# Patient Record
Sex: FEMALE | Race: WHITE | NOT HISPANIC OR LATINO | URBAN - METROPOLITAN AREA
[De-identification: names, ages, dates, MRNs, and addresses within clinical notes are randomized per-mention and may not be internally consistent; named-entity substitution may affect disease eponyms.]

---

## 2023-08-18 ENCOUNTER — EMERGENCY (EMERGENCY)
Facility: HOSPITAL | Age: 28
LOS: 1 days | Discharge: HOME | End: 2023-08-18
Attending: EMERGENCY MEDICINE | Admitting: EMERGENCY MEDICINE
Payer: COMMERCIAL

## 2023-08-18 VITALS
HEART RATE: 86 BPM | OXYGEN SATURATION: 98 % | DIASTOLIC BLOOD PRESSURE: 86 MMHG | SYSTOLIC BLOOD PRESSURE: 119 MMHG | WEIGHT: 179.9 LBS | TEMPERATURE: 98 F | RESPIRATION RATE: 16 BRPM | HEIGHT: 64 IN

## 2023-08-18 VITALS
OXYGEN SATURATION: 99 % | HEART RATE: 79 BPM | DIASTOLIC BLOOD PRESSURE: 65 MMHG | SYSTOLIC BLOOD PRESSURE: 100 MMHG | TEMPERATURE: 98 F | RESPIRATION RATE: 17 BRPM

## 2023-08-18 LAB
ALBUMIN SERPL ELPH-MCNC: 4 G/DL — SIGNIFICANT CHANGE UP (ref 3.3–5)
ALP SERPL-CCNC: 84 U/L — SIGNIFICANT CHANGE UP (ref 40–120)
ALT FLD-CCNC: 18 U/L — SIGNIFICANT CHANGE UP (ref 12–78)
ANION GAP SERPL CALC-SCNC: 6 MMOL/L — SIGNIFICANT CHANGE UP (ref 5–17)
APPEARANCE UR: ABNORMAL
AST SERPL-CCNC: 14 U/L — LOW (ref 15–37)
BASOPHILS # BLD AUTO: 0.04 K/UL — SIGNIFICANT CHANGE UP (ref 0–0.2)
BASOPHILS NFR BLD AUTO: 0.5 % — SIGNIFICANT CHANGE UP (ref 0–2)
BILIRUB SERPL-MCNC: 0.3 MG/DL — SIGNIFICANT CHANGE UP (ref 0.2–1.2)
BILIRUB UR-MCNC: NEGATIVE — SIGNIFICANT CHANGE UP
BUN SERPL-MCNC: 14 MG/DL — SIGNIFICANT CHANGE UP (ref 7–23)
CALCIUM SERPL-MCNC: 9.6 MG/DL — SIGNIFICANT CHANGE UP (ref 8.5–10.1)
CHLORIDE SERPL-SCNC: 107 MMOL/L — SIGNIFICANT CHANGE UP (ref 96–108)
CO2 SERPL-SCNC: 24 MMOL/L — SIGNIFICANT CHANGE UP (ref 22–31)
COLOR SPEC: YELLOW — SIGNIFICANT CHANGE UP
CREAT SERPL-MCNC: 0.85 MG/DL — SIGNIFICANT CHANGE UP (ref 0.5–1.3)
DIFF PNL FLD: ABNORMAL
EGFR: 95 ML/MIN/1.73M2 — SIGNIFICANT CHANGE UP
EOSINOPHIL # BLD AUTO: 0.02 K/UL — SIGNIFICANT CHANGE UP (ref 0–0.5)
EOSINOPHIL NFR BLD AUTO: 0.3 % — SIGNIFICANT CHANGE UP (ref 0–6)
GLUCOSE SERPL-MCNC: 140 MG/DL — HIGH (ref 70–99)
GLUCOSE UR QL: NEGATIVE MG/DL — SIGNIFICANT CHANGE UP
HCG SERPL-ACNC: <1 MIU/ML — SIGNIFICANT CHANGE UP
HCT VFR BLD CALC: 31.2 % — LOW (ref 34.5–45)
HGB BLD-MCNC: 8.8 G/DL — LOW (ref 11.5–15.5)
IMM GRANULOCYTES NFR BLD AUTO: 0.3 % — SIGNIFICANT CHANGE UP (ref 0–0.9)
KETONES UR-MCNC: ABNORMAL MG/DL
LEUKOCYTE ESTERASE UR-ACNC: NEGATIVE — SIGNIFICANT CHANGE UP
LYMPHOCYTES # BLD AUTO: 1.13 K/UL — SIGNIFICANT CHANGE UP (ref 1–3.3)
LYMPHOCYTES # BLD AUTO: 14.2 % — SIGNIFICANT CHANGE UP (ref 13–44)
MCHC RBC-ENTMCNC: 17.5 PG — LOW (ref 27–34)
MCHC RBC-ENTMCNC: 28.2 GM/DL — LOW (ref 32–36)
MCV RBC AUTO: 61.9 FL — LOW (ref 80–100)
MONOCYTES # BLD AUTO: 0.41 K/UL — SIGNIFICANT CHANGE UP (ref 0–0.9)
MONOCYTES NFR BLD AUTO: 5.2 % — SIGNIFICANT CHANGE UP (ref 2–14)
NEUTROPHILS # BLD AUTO: 6.31 K/UL — SIGNIFICANT CHANGE UP (ref 1.8–7.4)
NEUTROPHILS NFR BLD AUTO: 79.5 % — HIGH (ref 43–77)
NITRITE UR-MCNC: NEGATIVE — SIGNIFICANT CHANGE UP
NRBC # BLD: 0 /100 WBCS — SIGNIFICANT CHANGE UP (ref 0–0)
PH UR: 6 — SIGNIFICANT CHANGE UP (ref 5–8)
PLATELET # BLD AUTO: 313 K/UL — SIGNIFICANT CHANGE UP (ref 150–400)
POTASSIUM SERPL-MCNC: 3.7 MMOL/L — SIGNIFICANT CHANGE UP (ref 3.5–5.3)
POTASSIUM SERPL-SCNC: 3.7 MMOL/L — SIGNIFICANT CHANGE UP (ref 3.5–5.3)
PROT SERPL-MCNC: 8 G/DL — SIGNIFICANT CHANGE UP (ref 6–8.3)
PROT UR-MCNC: 100 MG/DL
RBC # BLD: 5.04 M/UL — SIGNIFICANT CHANGE UP (ref 3.8–5.2)
RBC # FLD: 20.9 % — HIGH (ref 10.3–14.5)
SODIUM SERPL-SCNC: 137 MMOL/L — SIGNIFICANT CHANGE UP (ref 135–145)
SP GR SPEC: 1.03 — HIGH (ref 1–1.03)
UROBILINOGEN FLD QL: 1 MG/DL — SIGNIFICANT CHANGE UP (ref 0.2–1)
WBC # BLD: 7.93 K/UL — SIGNIFICANT CHANGE UP (ref 3.8–10.5)
WBC # FLD AUTO: 7.93 K/UL — SIGNIFICANT CHANGE UP (ref 3.8–10.5)

## 2023-08-18 PROCEDURE — 96374 THER/PROPH/DIAG INJ IV PUSH: CPT

## 2023-08-18 PROCEDURE — 80053 COMPREHEN METABOLIC PANEL: CPT

## 2023-08-18 PROCEDURE — 84702 CHORIONIC GONADOTROPIN TEST: CPT

## 2023-08-18 PROCEDURE — 74176 CT ABD & PELVIS W/O CONTRAST: CPT | Mod: 26,MA

## 2023-08-18 PROCEDURE — 99284 EMERGENCY DEPT VISIT MOD MDM: CPT | Mod: 25

## 2023-08-18 PROCEDURE — 81001 URINALYSIS AUTO W/SCOPE: CPT

## 2023-08-18 PROCEDURE — 36415 COLL VENOUS BLD VENIPUNCTURE: CPT

## 2023-08-18 PROCEDURE — 74176 CT ABD & PELVIS W/O CONTRAST: CPT | Mod: MA

## 2023-08-18 PROCEDURE — 96375 TX/PRO/DX INJ NEW DRUG ADDON: CPT

## 2023-08-18 PROCEDURE — 85025 COMPLETE CBC W/AUTO DIFF WBC: CPT

## 2023-08-18 PROCEDURE — 99284 EMERGENCY DEPT VISIT MOD MDM: CPT

## 2023-08-18 RX ORDER — SODIUM CHLORIDE 9 MG/ML
1000 INJECTION INTRAMUSCULAR; INTRAVENOUS; SUBCUTANEOUS ONCE
Refills: 0 | Status: COMPLETED | OUTPATIENT
Start: 2023-08-18 | End: 2023-08-18

## 2023-08-18 RX ORDER — KETOROLAC TROMETHAMINE 30 MG/ML
30 SYRINGE (ML) INJECTION ONCE
Refills: 0 | Status: DISCONTINUED | OUTPATIENT
Start: 2023-08-18 | End: 2023-08-18

## 2023-08-18 RX ORDER — DEXTROAMPHETAMINE SACCHARATE, AMPHETAMINE ASPARTATE, DEXTROAMPHETAMINE SULFATE AND AMPHETAMINE SULFATE 1.875; 1.875; 1.875; 1.875 MG/1; MG/1; MG/1; MG/1
1 TABLET ORAL
Refills: 0 | DISCHARGE

## 2023-08-18 RX ORDER — CEFUROXIME AXETIL 250 MG
1 TABLET ORAL
Qty: 14 | Refills: 0
Start: 2023-08-18 | End: 2023-08-24

## 2023-08-18 RX ORDER — ONDANSETRON 8 MG/1
4 TABLET, FILM COATED ORAL ONCE
Refills: 0 | Status: COMPLETED | OUTPATIENT
Start: 2023-08-18 | End: 2023-08-18

## 2023-08-18 RX ORDER — ALPRAZOLAM 0.25 MG
1 TABLET ORAL
Refills: 0 | DISCHARGE

## 2023-08-18 RX ORDER — FLUCONAZOLE 150 MG/1
1 TABLET ORAL
Qty: 1 | Refills: 0
Start: 2023-08-18

## 2023-08-18 RX ORDER — LACTOBACILLUS ACIDOPHILUS 100MM CELL
2 CAPSULE ORAL
Qty: 28 | Refills: 0
Start: 2023-08-18 | End: 2023-08-24

## 2023-08-18 RX ORDER — ACETAMINOPHEN 500 MG
1000 TABLET ORAL ONCE
Refills: 0 | Status: COMPLETED | OUTPATIENT
Start: 2023-08-18 | End: 2023-08-18

## 2023-08-18 RX ADMIN — Medication 1000 MILLIGRAM(S): at 06:42

## 2023-08-18 RX ADMIN — Medication 400 MILLIGRAM(S): at 05:28

## 2023-08-18 RX ADMIN — ONDANSETRON 4 MILLIGRAM(S): 8 TABLET, FILM COATED ORAL at 05:28

## 2023-08-18 RX ADMIN — SODIUM CHLORIDE 1000 MILLILITER(S): 9 INJECTION INTRAMUSCULAR; INTRAVENOUS; SUBCUTANEOUS at 05:28

## 2023-08-18 NOTE — ED PROVIDER NOTE - NSFOLLOWUPINSTRUCTIONS_ED_ALL_ED_FT
1. Follow-up with your Primary Medical doctor. Call today for prompt follow-up.  2. Return to Emergency room for any worsening or persistent pain, weakness, fever, dizziness, passing out, difficulty breathing or any other concerning symptoms.  3. See attached instruction sheets for additional information, including information regarding signs and symptoms to look out for, reasons to seek immediate care and other important instructions.  4.  Plenty of fluids  5.  Ceftin twice daily for 7 days 1. Follow-up with your Primary Medical doctor. Call today for prompt follow-up.  2. Return to Emergency room for any worsening or persistent pain, weakness, fever, dizziness, passing out, difficulty breathing or any other concerning symptoms.  3. See attached instruction sheets for additional information, including information regarding signs and symptoms to look out for, reasons to seek immediate care and other important instructions.  4.  Plenty of fluids  5.  Ceftin twice daily for 7 days  6.  Lactobacillus twice daily for 7 days  7.  Diflucan x1, as needed for yeast infection

## 2023-08-18 NOTE — ED ADULT NURSE NOTE - NS ED NOTE ABUSE SUSPICION NEGLECT YN
Discussion/Summary   Please note your MRI lumbar spirn report , if pain still persists we will ref you to pain clinic,  DR. VELASQUEZ        Verified Results  MRI LUMBAR SPINE WO CON [id number] 07:00AM ROHITH LUA     Test Name Result Flag Reference   MRI LUMBAR SPINE WO CON (Report)     Accession #    [id number]     Exam: MRI LUMBAR SPINE WO CON    CLINICAL INDICATION: 52 years Female   M54.5    COMPARISON: Radiograph 02/12/2018    TECHNIQUE: MRI of lumbar spine at 4 mm sections was performed using T1, T2 sagittal/axial and   STIR sagittal sequences.    FINDINGS:    This report was interpreted with underlying premise of five lumbar vertebral bodies with the   lowest disc space as being as L5-S1.    Alignment and attributes of vertebral bodies:    The lumbar spine vertebral bodies are in anatomic alignment.   No alteration in signal   intensity is present in the bone marrow to indicate bone marrow infiltrative process.  No   fracture or destructive process is seen in the lumbar spine. No abnormal signal intensity is   seen in the paraspinal musculature.    The spinal cord is not low lying. Negative for mass and the conus medullaris or cauda equina.    Intervertebral discs:    There is desiccation of lumbar discs with decreased disc heights    Level by level analysis:    T12-L1 , T11-T12 : Negative for an extra-axial defect.    L1-L2: There is no disc bulge. There is no central canal stenosis. There is moderate facet   osteoarthritis. There is no neural foraminal stenosis.    L2-L3: There is no disc bulge. There is no central canal stenosis. There is mild facet   osteoarthritis. There is mild neural foraminal stenosis.    L3-L4: There is mild disc bulge. There is mild central canal stenosis. There is mild facet   osteoarthritis. There is right neural foraminal stenosis.    L4-L5: There is mild disc bulge. There is mild central canal stenosis. There is moderate facet   osteoarthritis. There is no neural foraminal  stenosis.    L5-S1: There is mild disc bulge. There is mild central canal stenosis. There is mild facet   osteoarthritis. There is no neural foraminal stenosis.    IMPRESSION:  1. Facet arthrosis.  **** F I N A L ****    Transcribed By: BISHOP   02/20/18 1:03 pm    Dictated By:      SILVIA CHESTER    Electronically Reviewed and Approved By:      SILVIA CHESTER 02/20/18 1:29 pm        No

## 2023-08-18 NOTE — ED ADULT NURSE NOTE - NSFALLUNIVINTERV_ED_ALL_ED
Bed/Stretcher in lowest position, wheels locked, appropriate side rails in place/Call bell, personal items and telephone in reach/Instruct patient to call for assistance before getting out of bed/chair/stretcher/Non-slip footwear applied when patient is off stretcher/[place] to call system/Physically safe environment - no spills, clutter or unnecessary equipment/Purposeful proactive rounding/Room/bathroom lighting operational, light cord in reach

## 2023-08-18 NOTE — ED PROVIDER NOTE - PATIENT PORTAL LINK FT
You can access the FollowMyHealth Patient Portal offered by [facility] by registering at the following website: [email or web address]. By joining [employer]’s FollowMyHealth portal, you will also be able to view your health information using other applications (apps) compatible with our system.

## 2023-08-18 NOTE — ED PROVIDER NOTE - CARE PROVIDER_API CALL
KRISTI COPE  [address] [place] [id number]  Phone: [phone]  Fax: ()-  Follow Up Time:

## 2023-08-18 NOTE — ED PROVIDER NOTE - OBJECTIVE STATEMENT
27-year-old female with history of idiopathic constipation is presenting with urinary urgency and frequency and right flank pain started at 2 AM.  1 episode of nausea and vomiting.  Reports nausea at this time.  Denies history of kidney stones.  Denies fever or chills.  Denies hematuria.

## 2023-08-18 NOTE — ED PROVIDER NOTE - CLINICAL SUMMARY MEDICAL DECISION MAKING FREE TEXT BOX
27-year-old female with history as mentioned above presenting with urinary frequency, urgency, right flank pain, right lower quadrant pains, starting at approximately 2 AM with 1 episode of vomiting.  No abdominal CVA tenderness.  Will evaluate for acute appendicitis, UTI, kidney stone.  We will check labs, CT abdomen pelvis.  Will reassess.  Will give pain management with IV Toradol, IV Zofran and IV fluids.

## 2023-08-18 NOTE — ED ADULT NURSE NOTE - OBJECTIVE STATEMENT
Pt received in 8A from triage, Aox3 and breathing unlabored on room air. Pt endorses 8/10 right flank pain and dysuria. abdomen soft and nondistended. Pending labs and imaging, will continue to monitor.

## 2023-08-18 NOTE — ED ADULT TRIAGE NOTE - CHIEF COMPLAINT QUOTE
Approx 10pm pt went to urinate and felt as if she did not completely empty bladder, went to sleep and around 2am woke up with right flank pain and continues to feel like she has to urinate.

## 2023-08-18 NOTE — ED PROVIDER NOTE - TEMPLATE, MLM
Regarding: fever 103  ----- Message from Ramos Moeller sent at 2/10/2018  8:09 PM CST -----     Patient Name: Monique Gonzalez  Specialist or PCP: Mariella   Pregnant (If Yes, how long?):m   Symptoms:fever 103  Call Back #:[phone]  Is the patient's permanent residence located in [place], [place], or a [facility]? Yes [facility] [address]  Call Center Account #:[id number]         Abdominal Pain, N/V/D

## 2023-08-18 NOTE — ED PROVIDER NOTE - PROGRESS NOTE DETAILS
Reevaluated patient at bedside.  Patient feeling much improved.  Discussed the results of all diagnostic testing in ED.  An opportunity to ask questions was given.  Discussed the nature of diagnostic testing in the abdomen and the importance of continued reevaluation.  Understanding of the potential risk of occult pathology was verbalized and the patient will continue to follow-up as an outpatient for further workup and evaluation until resolution.  Discussed the importance of prompt, close medical follow-up.  Patient will return with any changes, concerns or persistent / worsening symptoms.  Patient with persistent urinary symptoms, will treat for presumptive UTI.  Pending culture.  Discussed with patient regarding all lab findings, importance of close prompt follow-up, to return with any acute changes or concerns.

## 2024-01-02 ENCOUNTER — NON-APPOINTMENT (OUTPATIENT)
Age: 29
End: 2024-01-02

## 2024-02-25 ENCOUNTER — EMERGENCY (EMERGENCY)
Facility: HOSPITAL | Age: 29
LOS: 1 days | Discharge: HOME | End: 2024-02-25
Attending: EMERGENCY MEDICINE
Payer: COMMERCIAL

## 2024-02-25 VITALS
OXYGEN SATURATION: 99 % | HEART RATE: 114 BPM | DIASTOLIC BLOOD PRESSURE: 94 MMHG | RESPIRATION RATE: 18 BRPM | SYSTOLIC BLOOD PRESSURE: 127 MMHG

## 2024-02-25 VITALS
HEART RATE: 115 BPM | TEMPERATURE: 98 F | DIASTOLIC BLOOD PRESSURE: 92 MMHG | WEIGHT: 199.96 LBS | RESPIRATION RATE: 16 BRPM | SYSTOLIC BLOOD PRESSURE: 150 MMHG | OXYGEN SATURATION: 100 % | HEIGHT: 64 IN

## 2024-02-25 LAB
ALBUMIN SERPL ELPH-MCNC: 4.5 G/DL — SIGNIFICANT CHANGE UP (ref 3.3–5)
ALP SERPL-CCNC: 115 U/L — SIGNIFICANT CHANGE UP (ref 40–120)
ALT FLD-CCNC: 16 U/L — SIGNIFICANT CHANGE UP (ref 10–45)
ANION GAP SERPL CALC-SCNC: 13 MMOL/L — SIGNIFICANT CHANGE UP (ref 5–17)
APTT BLD: 33.4 SEC — SIGNIFICANT CHANGE UP (ref 24.5–35.6)
AST SERPL-CCNC: 14 U/L — SIGNIFICANT CHANGE UP (ref 10–40)
BASOPHILS # BLD AUTO: 0.06 K/UL — SIGNIFICANT CHANGE UP (ref 0–0.2)
BASOPHILS NFR BLD AUTO: 0.6 % — SIGNIFICANT CHANGE UP (ref 0–2)
BILIRUB SERPL-MCNC: 0.1 MG/DL — LOW (ref 0.2–1.2)
BLD GP AB SCN SERPL QL: NEGATIVE — SIGNIFICANT CHANGE UP
BUN SERPL-MCNC: 14 MG/DL — SIGNIFICANT CHANGE UP (ref 7–23)
CALCIUM SERPL-MCNC: 9.6 MG/DL — SIGNIFICANT CHANGE UP (ref 8.4–10.5)
CHLORIDE SERPL-SCNC: 106 MMOL/L — SIGNIFICANT CHANGE UP (ref 96–108)
CO2 SERPL-SCNC: 21 MMOL/L — LOW (ref 22–31)
CREAT SERPL-MCNC: 0.57 MG/DL — SIGNIFICANT CHANGE UP (ref 0.5–1.3)
EGFR: 125 ML/MIN/1.73M2 — SIGNIFICANT CHANGE UP
EOSINOPHIL # BLD AUTO: 0.09 K/UL — SIGNIFICANT CHANGE UP (ref 0–0.5)
EOSINOPHIL NFR BLD AUTO: 0.9 % — SIGNIFICANT CHANGE UP (ref 0–6)
GLUCOSE SERPL-MCNC: 109 MG/DL — HIGH (ref 70–99)
HCG SERPL-ACNC: 126 MIU/ML — HIGH
HCT VFR BLD CALC: 39.4 % — SIGNIFICANT CHANGE UP (ref 34.5–45)
HGB BLD-MCNC: 12.5 G/DL — SIGNIFICANT CHANGE UP (ref 11.5–15.5)
IMM GRANULOCYTES NFR BLD AUTO: 0.4 % — SIGNIFICANT CHANGE UP (ref 0–0.9)
INR BLD: 0.95 RATIO — SIGNIFICANT CHANGE UP (ref 0.85–1.18)
LYMPHOCYTES # BLD AUTO: 2.74 K/UL — SIGNIFICANT CHANGE UP (ref 1–3.3)
LYMPHOCYTES # BLD AUTO: 27 % — SIGNIFICANT CHANGE UP (ref 13–44)
MCHC RBC-ENTMCNC: 26.5 PG — LOW (ref 27–34)
MCHC RBC-ENTMCNC: 31.7 GM/DL — LOW (ref 32–36)
MCV RBC AUTO: 83.7 FL — SIGNIFICANT CHANGE UP (ref 80–100)
MONOCYTES # BLD AUTO: 0.96 K/UL — HIGH (ref 0–0.9)
MONOCYTES NFR BLD AUTO: 9.4 % — SIGNIFICANT CHANGE UP (ref 2–14)
NEUTROPHILS # BLD AUTO: 6.27 K/UL — SIGNIFICANT CHANGE UP (ref 1.8–7.4)
NEUTROPHILS NFR BLD AUTO: 61.7 % — SIGNIFICANT CHANGE UP (ref 43–77)
NRBC # BLD: 0 /100 WBCS — SIGNIFICANT CHANGE UP (ref 0–0)
PLATELET # BLD AUTO: 392 K/UL — SIGNIFICANT CHANGE UP (ref 150–400)
POTASSIUM SERPL-MCNC: 4 MMOL/L — SIGNIFICANT CHANGE UP (ref 3.5–5.3)
POTASSIUM SERPL-SCNC: 4 MMOL/L — SIGNIFICANT CHANGE UP (ref 3.5–5.3)
PROT SERPL-MCNC: 7.6 G/DL — SIGNIFICANT CHANGE UP (ref 6–8.3)
PROTHROM AB SERPL-ACNC: 10 SEC — SIGNIFICANT CHANGE UP (ref 9.5–13)
RBC # BLD: 4.71 M/UL — SIGNIFICANT CHANGE UP (ref 3.8–5.2)
RBC # FLD: 16.8 % — HIGH (ref 10.3–14.5)
RH IG SCN BLD-IMP: POSITIVE — SIGNIFICANT CHANGE UP
SODIUM SERPL-SCNC: 140 MMOL/L — SIGNIFICANT CHANGE UP (ref 135–145)
WBC # BLD: 10.16 K/UL — SIGNIFICANT CHANGE UP (ref 3.8–10.5)
WBC # FLD AUTO: 10.16 K/UL — SIGNIFICANT CHANGE UP (ref 3.8–10.5)

## 2024-02-25 PROCEDURE — 99284 EMERGENCY DEPT VISIT MOD MDM: CPT | Mod: 25

## 2024-02-25 PROCEDURE — 80053 COMPREHEN METABOLIC PANEL: CPT

## 2024-02-25 PROCEDURE — 76817 TRANSVAGINAL US OBSTETRIC: CPT

## 2024-02-25 PROCEDURE — 86850 RBC ANTIBODY SCREEN: CPT

## 2024-02-25 PROCEDURE — 85730 THROMBOPLASTIN TIME PARTIAL: CPT

## 2024-02-25 PROCEDURE — 36415 COLL VENOUS BLD VENIPUNCTURE: CPT

## 2024-02-25 PROCEDURE — 76817 TRANSVAGINAL US OBSTETRIC: CPT | Mod: 26

## 2024-02-25 PROCEDURE — 86901 BLOOD TYPING SEROLOGIC RH(D): CPT

## 2024-02-25 PROCEDURE — 99285 EMERGENCY DEPT VISIT HI MDM: CPT

## 2024-02-25 PROCEDURE — 85610 PROTHROMBIN TIME: CPT

## 2024-02-25 PROCEDURE — 86900 BLOOD TYPING SEROLOGIC ABO: CPT

## 2024-02-25 PROCEDURE — 84702 CHORIONIC GONADOTROPIN TEST: CPT

## 2024-02-25 PROCEDURE — 85025 COMPLETE CBC W/AUTO DIFF WBC: CPT

## 2024-02-25 NOTE — CHART NOTE - NSCHARTNOTEFT_GEN_A_CORE
Consulted by ED at 7:05 am due to patient with vaginal spotting (resolved) found to have PUL. Nature of consult was for patient to be added to [facility] bHCG List. Notified ED provider that we will need to evaluate patient. However, at time of consult, "discharge" already written on ED status board. Discharge note from ED provider signed at 6:47 am, prior to time of consult. Provider stated they would ask patient to wait for GYN evaluation but that she was very anxious and wanted to leave as soon as possible.    Attempted to see patient at 7:25 am, 20 minutes after consult placed, however patient no longer seen on ED board. Called ED provider back x2 to find location of patient however no answer. Patient left prior to being seen by GYN.    Patient will be added to [facility] GYN CG List.    D/w Dr. Mota, service attending  [facility] PGY2

## 2024-02-25 NOTE — ED PROVIDER NOTE - PATIENT PORTAL LINK FT
You can access the FollowMyHealth Patient Portal offered by [facility] by registering at the following website: [email or web address]. By joining [employer]’s FollowMyHealth portal, you will also be able to view your health information using other applications (apps) compatible with our system.

## 2024-02-25 NOTE — ED PROVIDER NOTE - ATTENDING CONTRIBUTION TO CARE
MD Keane:  patient seen and evaluated personally.   I agree with the History & Physical,  Impression & Plan other than what was detailed in my note.  MD Keane  27 y/o f presenting to ed w/ cc of vaginal spotting, lmp 4-5 weeks ago, pos preg on urine test, had scant blood on tp when wiping, had some abd pain that has resolved described as cramping, no frequency/urgeny, pain was mild,  non toxic well appearing, NC/AT,  conjunctiva non conjected, sclera anicteric, moist mucous membranes, neck supple, heart sounds, normal, no mrg, lungs cta b/l no wrr, abd soft non distended w/ no tenderness, no visual deformities of extremities, axox3, pt anxious, tearful , normal mood and affect, pt showed me blood very scant, pt states she came here for "assurance" and is very upset that she has to stay, it is making her anxious, she does not want needles, did try to console her but stated I cannot offer assurance w/out perfoming workup, pt is made anxious by this but is amenable. discussed potential timeline in ed although this is subject to change.

## 2024-02-25 NOTE — ED PROVIDER NOTE - NSFOLLOWUPINSTRUCTIONS_ED_ALL_ED_FT
Please follow-up with your OB/GYN as soon as possible.  In order to facilitate a quicker appointment than March,  I will utilize the discharge center to assist you.  The discharge center will call you in 2-3 business days during the afternoon hours, in order to expedite an OB/GYN appointment.  They work with you, your location, and insurance to find the best appointment for yourself.   Return to the emergency room if you start having increased amount of bleeding, fevers, chills, abdominal pain that is unusual.      In addition, I am having our OB/GYN add you to a beta list, in order to check your beta-hCG every 48 hours in the setting of an unknown location pregnancy. MED/SURG

## 2024-02-25 NOTE — ED PROVIDER NOTE - PROGRESS NOTE DETAILS
Attending Lakhwinder:  pt came out of room to say she is upset she has not been seen, intern was evaluating pt but midway through had to go to micu with a critical patient, she discussed w/ senior resident who assumed care and is currently at bs with pt. pt is currently hd stable, non toxic appearing .

## 2024-02-25 NOTE — ED ADULT NURSE NOTE - NSFALLUNIVINTERV_ED_ALL_ED
Bed/Stretcher in lowest position, wheels locked, appropriate side rails in place/Call bell, personal items and telephone in reach/Instruct patient to call for assistance before getting out of bed/chair/stretcher/Non-slip footwear applied when patient is off stretcher/[place] to call system/Physically safe environment - no spills, clutter or unnecessary equipment/Purposeful proactive rounding/Room/bathroom lighting operational, light cord in reach

## 2024-02-25 NOTE — ED PROVIDER NOTE - PHYSICAL EXAMINATION
Exam as stated below:   CONSTITUTIONAL: In NAD.   SKIN: Warm dry. No rashes noted.   HEAD: NCAT.   EYES: No scleral icterus. Conjunctiva pink.  ABD: Soft. Non-tender. Not distended.   Vaginal:   MSK: No pedal edema. No calf tenderness.  NEURO: UE/LE grossly intact. Motor UE/LE sensation grossly intact. CN II-XII grossly intact.   PSYCH: Cooperative, appropriate.

## 2024-02-25 NOTE — ED PROVIDER NOTE - CLINICAL SUMMARY MEDICAL DECISION MAKING FREE TEXT BOX
MDM/Summary/DDx (includes but is not limited to):  28-year-old female G1, P0 approximately 4 weeks dated by herself with a last menstrual period coming in with 1 episode of isolated vaginal bleeding with slight cramping on the right overlying area of adnexa.  Patient is concerned and anxious in the room.  vital signs not showing  shock state.  No dysuria or urinary complaints.   At 4 weeks approximately, questionable if we will see fetal pole or yolk sac.  Will attempt to visualize intrauterine pregnancy or the beginnings of intrauterine pregnancy.  Will obtain hCG exam and history is possibly consistent with miscarriage  versus ectopic   Labs: cbc cmp hcg ts pt ptt  Imaging: TVUS   Tx: Supportive, pain/nausea medications as pt requires/requests.  Consults/Resources: obgyn should it be indicated    Dispo: likely home     Triage note reviewed. VS reviewed. EKG reviewed and documented in "RESULTS" section, if possible at given time.     DDx in MDM includes the most likely ddx, but is not limited to solely what is listed. Clinical course may alter/deviate from the above plan. When possible, progress notes written, as needed, and are included in "PROGRESS NOTE" section below.       Medical, family, and social determinants of health reviewed and discussed w/ pt/family/caretaker, when allowable, and is incorporated into note above, whenever possible.

## 2024-02-25 NOTE — ED ADULT TRIAGE NOTE - CHIEF COMPLAINT QUOTE
C/o vaginal spotting noted this evening, noted while wiping. States (+) pregnancy test Tuesday. Endorses intermittent mild abdominal pain x few days

## 2024-02-25 NOTE — ED ADULT NURSE NOTE - SUICIDE SCREENING QUESTION 1
07/26/22      Kavitha Lucia  [address] [facility] [address]    CERTIFICATE OF EXCUSE FROM WORK    This is to certify that Kavitha Lucia has been under my care.    Please excuse from work on Tuesday 07/26/22.        SIGNATURE:___________________________________________                             Dr. Timothy Dudley   Urgent Care   [facility]  [address] [place] [address] [phone]  
No

## 2024-02-25 NOTE — ED PROVIDER NOTE - NSPTACCESSSVCSAPPTDETAILS_ED_ALL_ED_FT
Patient needs a very urgent appointment for OB/GYN,  I am concerned that she had or is having a miscarriage and she needs to see somebody for the first time and establish care soon as possible.

## 2024-02-25 NOTE — ED ADULT NURSE NOTE - DISCHARGE DATE/TIME
DISCHARGE REPORT    Progress reporting period is from 5/9/22 to 8/9/22.       SUBJECTIVE  Subjective changes noted by patient:  Patient reports improving left knee pain, strength, ROM and overall function. Patient has been using stationary bike and leg press machine at the gym. Patient reports occasional low back pain possibly from sitting with poor posture. Interested in learning stretches for low back.    Current Pain level: 0/10.     Initial Pain level: 5/10.   Changes in function:  Yes (See Goal flowsheet attached for changes in current functional level)  Adverse reaction to treatment or activity: None    OBJECTIVE  Changes noted in objective findings:  Yes,   Objective: left knee AAROM: 0-0-118     ASSESSMENT/PLAN  Updated problem list and treatment plan: Diagnosis 1:  S/p left TKA    STG/LTGs have been met or progress has been made towards goals:  Yes (See Goal flow sheet completed today.)  Assessment of Progress: The patient's condition is improving.  Self Management Plans:  Patient is independent in a home treatment program.  Patient is independent in self management of symptoms.    Ru continues to require the following intervention to meet STG and LTG's:  PT intervention is no longer required to meet STG/LTG.    Recommendations:  This patient is ready to be discharged from therapy and continue their home treatment program.    Please refer to the daily flowsheet for treatment today, total treatment time and time spent performing 1:1 timed codes.        
25-Feb-2024 07:09

## 2024-02-25 NOTE — ED ADULT NURSE NOTE - OBJECTIVE STATEMENT
Pt presents to the ED A&ox4 complaining of vaginal spotting tonight upon wiping. Pt states she is approx 4 weeks pregnant as per LMP. Pt also reporting R side tenderness. Pt has had no recent OB visits/ imaging performed. Denies chest pain, shortness of breath, headache, dizziness, weakness, fevers. Pt presents to the ED A&ox4 complaining of vaginal spotting tonight upon wiping. Pt states she is approx 4 weeks pregnant as per LMP. Pt also reporting RLQ abd tenderness. Pt has had no recent OB visits/ imaging performed. Denies chest pain, shortness of breath, headache, dizziness, weakness, fevers.

## 2024-02-25 NOTE — ED PROVIDER NOTE - OBJECTIVE STATEMENT
HPI & ROS: 28-year-old female G1, P0 approximately 4 weeks dated by herself last menstrual period, coming in with vaginal bleeding.  Patient has had earlier tonight 1 isolated amount of vaginal bleeding noted on a napkin brought by the patient to be approximately 1 tablespoon dark blood.  No clots.  Patient having intermittent very mild 1 out of 10 cramping on her right side overlying her right adnexa or where that location is.  No fevers no chills no nausea no vomiting.  Patient has not seen an OB/GYN yet, scheduled for March.   No ultrasounds were performed.

## 2024-02-25 NOTE — ED ADULT NURSE NOTE - NS ED NURSE LEVEL OF CONSCIOUSNESS MENTAL STATUS
Awake/Alert/Cooperative High Dose Vitamin A Pregnancy And Lactation Text: High dose vitamin A therapy is contraindicated during pregnancy and breast feeding.

## 2024-02-27 ENCOUNTER — APPOINTMENT (OUTPATIENT)
Dept: OBGYN | Facility: CLINIC | Age: 29
End: 2024-02-27
Payer: COMMERCIAL

## 2024-02-27 PROCEDURE — 36415 COLL VENOUS BLD VENIPUNCTURE: CPT

## 2024-03-04 ENCOUNTER — APPOINTMENT (OUTPATIENT)
Dept: OBGYN | Facility: CLINIC | Age: 29
End: 2024-03-04
Payer: COMMERCIAL

## 2024-03-04 PROCEDURE — 36415 COLL VENOUS BLD VENIPUNCTURE: CPT

## 2024-03-15 ENCOUNTER — APPOINTMENT (OUTPATIENT)
Dept: OBGYN | Facility: CLINIC | Age: 29
End: 2024-03-15

## 2024-03-15 ENCOUNTER — APPOINTMENT (OUTPATIENT)
Dept: OBGYN | Facility: CLINIC | Age: 29
End: 2024-03-15
Payer: COMMERCIAL

## 2024-03-15 PROCEDURE — 99204 OFFICE O/P NEW MOD 45 MIN: CPT | Mod: 25

## 2024-03-15 PROCEDURE — 76817 TRANSVAGINAL US OBSTETRIC: CPT

## 2024-03-15 PROCEDURE — 36415 COLL VENOUS BLD VENIPUNCTURE: CPT

## 2024-04-01 ENCOUNTER — APPOINTMENT (OUTPATIENT)
Dept: OBGYN | Facility: CLINIC | Age: 29
End: 2024-04-01
Payer: COMMERCIAL

## 2024-04-01 PROCEDURE — 76815 OB US LIMITED FETUS(S): CPT

## 2024-04-01 PROCEDURE — 99213 OFFICE O/P EST LOW 20 MIN: CPT | Mod: 25

## 2024-04-02 ENCOUNTER — APPOINTMENT (OUTPATIENT)
Dept: OBGYN | Facility: CLINIC | Age: 29
End: 2024-04-02

## 2024-04-09 ENCOUNTER — APPOINTMENT (OUTPATIENT)
Dept: OBGYN | Facility: CLINIC | Age: 29
End: 2024-04-09
Payer: COMMERCIAL

## 2024-04-09 ENCOUNTER — APPOINTMENT (OUTPATIENT)
Dept: OBGYN | Facility: CLINIC | Age: 29
End: 2024-04-09

## 2024-04-09 PROCEDURE — 76817 TRANSVAGINAL US OBSTETRIC: CPT

## 2024-04-09 PROCEDURE — 0502F SUBSEQUENT PRENATAL CARE: CPT

## 2024-04-09 PROCEDURE — 36415 COLL VENOUS BLD VENIPUNCTURE: CPT

## 2024-04-19 ENCOUNTER — APPOINTMENT (OUTPATIENT)
Dept: OBGYN | Facility: CLINIC | Age: 29
End: 2024-04-19
Payer: COMMERCIAL

## 2024-04-19 PROCEDURE — 0502F SUBSEQUENT PRENATAL CARE: CPT

## 2024-04-19 PROCEDURE — 76813 OB US NUCHAL MEAS 1 GEST: CPT

## 2024-04-19 PROCEDURE — 36415 COLL VENOUS BLD VENIPUNCTURE: CPT

## 2024-04-26 ENCOUNTER — APPOINTMENT (OUTPATIENT)
Dept: OBGYN | Facility: CLINIC | Age: 29
End: 2024-04-26
Payer: COMMERCIAL

## 2024-04-26 PROCEDURE — 36415 COLL VENOUS BLD VENIPUNCTURE: CPT

## 2024-05-17 ENCOUNTER — APPOINTMENT (OUTPATIENT)
Dept: OBGYN | Facility: CLINIC | Age: 29
End: 2024-05-17

## 2024-05-17 ENCOUNTER — APPOINTMENT (OUTPATIENT)
Dept: OBGYN | Facility: CLINIC | Age: 29
End: 2024-05-17
Payer: COMMERCIAL

## 2024-05-17 PROCEDURE — 76816 OB US FOLLOW-UP PER FETUS: CPT

## 2024-05-17 PROCEDURE — 0502F SUBSEQUENT PRENATAL CARE: CPT

## 2024-05-17 PROCEDURE — 36415 COLL VENOUS BLD VENIPUNCTURE: CPT

## 2024-05-21 PROBLEM — Z00.00 ENCOUNTER FOR PREVENTIVE HEALTH EXAMINATION: Status: ACTIVE | Noted: 2024-05-21

## 2024-05-24 ENCOUNTER — APPOINTMENT (OUTPATIENT)
Dept: OBGYN | Facility: CLINIC | Age: 29
End: 2024-05-24

## 2024-05-29 ENCOUNTER — APPOINTMENT (OUTPATIENT)
Dept: OBGYN | Facility: CLINIC | Age: 29
End: 2024-05-29
Payer: COMMERCIAL

## 2024-05-29 PROCEDURE — 36415 COLL VENOUS BLD VENIPUNCTURE: CPT

## 2024-06-13 ENCOUNTER — ASOB RESULT (OUTPATIENT)
Age: 29
End: 2024-06-13

## 2024-06-13 ENCOUNTER — APPOINTMENT (OUTPATIENT)
Dept: ANTEPARTUM | Facility: CLINIC | Age: 29
End: 2024-06-13
Payer: COMMERCIAL

## 2024-06-13 PROCEDURE — 76811 OB US DETAILED SNGL FETUS: CPT

## 2024-06-14 ENCOUNTER — APPOINTMENT (OUTPATIENT)
Dept: OBGYN | Facility: CLINIC | Age: 29
End: 2024-06-14
Payer: COMMERCIAL

## 2024-06-14 PROCEDURE — 0502F SUBSEQUENT PRENATAL CARE: CPT

## 2024-06-17 ENCOUNTER — APPOINTMENT (OUTPATIENT)
Dept: ANTEPARTUM | Facility: CLINIC | Age: 29
End: 2024-06-17

## 2024-07-15 ENCOUNTER — APPOINTMENT (OUTPATIENT)
Dept: OBGYN | Facility: CLINIC | Age: 29
End: 2024-07-15
Payer: COMMERCIAL

## 2024-07-15 PROCEDURE — 0502F SUBSEQUENT PRENATAL CARE: CPT

## 2024-07-15 PROCEDURE — 96127 BRIEF EMOTIONAL/BEHAV ASSMT: CPT

## 2024-08-06 ENCOUNTER — APPOINTMENT (OUTPATIENT)
Dept: OBGYN | Facility: CLINIC | Age: 29
End: 2024-08-06

## 2024-08-06 PROCEDURE — 76816 OB US FOLLOW-UP PER FETUS: CPT

## 2024-08-06 PROCEDURE — 99213 OFFICE O/P EST LOW 20 MIN: CPT | Mod: 25

## 2024-08-06 PROCEDURE — 76819 FETAL BIOPHYS PROFIL W/O NST: CPT | Mod: 59

## 2024-08-12 ENCOUNTER — APPOINTMENT (OUTPATIENT)
Dept: OBGYN | Facility: CLINIC | Age: 29
End: 2024-08-12
Payer: COMMERCIAL

## 2024-08-12 PROCEDURE — 76819 FETAL BIOPHYS PROFIL W/O NST: CPT | Mod: 59

## 2024-08-12 PROCEDURE — 0502F SUBSEQUENT PRENATAL CARE: CPT

## 2024-08-12 PROCEDURE — 76816 OB US FOLLOW-UP PER FETUS: CPT

## 2024-08-19 ENCOUNTER — APPOINTMENT (OUTPATIENT)
Dept: PEDIATRIC MEDICAL GENETICS | Facility: CLINIC | Age: 29
End: 2024-08-19

## 2024-08-19 ENCOUNTER — APPOINTMENT (OUTPATIENT)
Dept: OBGYN | Facility: CLINIC | Age: 29
End: 2024-08-19
Payer: COMMERCIAL

## 2024-08-19 PROCEDURE — 76816 OB US FOLLOW-UP PER FETUS: CPT

## 2024-08-19 PROCEDURE — 36415 COLL VENOUS BLD VENIPUNCTURE: CPT

## 2024-08-19 PROCEDURE — 0502F SUBSEQUENT PRENATAL CARE: CPT

## 2024-08-19 PROCEDURE — 76819 FETAL BIOPHYS PROFIL W/O NST: CPT | Mod: 59

## 2024-08-22 ENCOUNTER — APPOINTMENT (OUTPATIENT)
Dept: ANTEPARTUM | Facility: CLINIC | Age: 29
End: 2024-08-22

## 2024-08-22 ENCOUNTER — ASOB RESULT (OUTPATIENT)
Age: 29
End: 2024-08-22

## 2024-08-22 PROCEDURE — 99202 OFFICE O/P NEW SF 15 MIN: CPT | Mod: 25

## 2024-08-22 PROCEDURE — 99212 OFFICE O/P EST SF 10 MIN: CPT | Mod: 25

## 2024-08-22 PROCEDURE — 76816 OB US FOLLOW-UP PER FETUS: CPT

## 2024-08-26 ENCOUNTER — APPOINTMENT (OUTPATIENT)
Dept: OBGYN | Facility: CLINIC | Age: 29
End: 2024-08-26
Payer: COMMERCIAL

## 2024-08-26 PROCEDURE — 36415 COLL VENOUS BLD VENIPUNCTURE: CPT

## 2024-08-28 ENCOUNTER — NON-APPOINTMENT (OUTPATIENT)
Age: 29
End: 2024-08-28

## 2024-08-28 ENCOUNTER — APPOINTMENT (OUTPATIENT)
Dept: MATERNAL FETAL MEDICINE | Facility: CLINIC | Age: 29
End: 2024-08-28

## 2024-08-28 ENCOUNTER — ASOB RESULT (OUTPATIENT)
Age: 29
End: 2024-08-28

## 2024-08-28 DIAGNOSIS — O24.419 GESTATIONAL DIABETES MELLITUS IN PREGNANCY, UNSPECIFIED CONTROL: ICD-10-CM

## 2024-08-28 PROCEDURE — G0108 DIAB MANAGE TRN  PER INDIV: CPT | Mod: 95

## 2024-08-28 RX ORDER — LANCETS 28 GAUGE
EACH MISCELLANEOUS
Qty: 4 | Refills: 2 | Status: ACTIVE | COMMUNITY
Start: 2024-08-28 | End: 1900-01-01

## 2024-08-28 RX ORDER — BLOOD SUGAR DIAGNOSTIC
STRIP MISCELLANEOUS
Qty: 2 | Refills: 2 | Status: ACTIVE | COMMUNITY
Start: 2024-08-28 | End: 1900-01-01

## 2024-08-28 RX ORDER — BLOOD-GLUCOSE METER
W/DEVICE KIT MISCELLANEOUS
Qty: 1 | Refills: 0 | Status: ACTIVE | COMMUNITY
Start: 2024-08-28 | End: 1900-01-01

## 2024-08-28 RX ORDER — URINE ACETONE TEST STRIPS
STRIP MISCELLANEOUS
Qty: 1 | Refills: 2 | Status: ACTIVE | COMMUNITY
Start: 2024-08-28 | End: 1900-01-01

## 2024-08-29 ENCOUNTER — NON-APPOINTMENT (OUTPATIENT)
Age: 29
End: 2024-08-29

## 2024-08-30 ENCOUNTER — NON-APPOINTMENT (OUTPATIENT)
Age: 29
End: 2024-08-30

## 2024-09-03 RX ORDER — BLOOD-GLUCOSE,RECEIVER,CONT
EACH MISCELLANEOUS
Qty: 1 | Refills: 0 | Status: ACTIVE | COMMUNITY
Start: 2024-09-03 | End: 1900-01-01

## 2024-09-03 RX ORDER — BLOOD-GLUCOSE SENSOR
EACH MISCELLANEOUS
Qty: 2 | Refills: 3 | Status: ACTIVE | COMMUNITY
Start: 2024-08-29 | End: 1900-01-01

## 2024-09-05 ENCOUNTER — APPOINTMENT (OUTPATIENT)
Dept: OBGYN | Facility: CLINIC | Age: 29
End: 2024-09-05
Payer: COMMERCIAL

## 2024-09-05 PROCEDURE — 76818 FETAL BIOPHYS PROFILE W/NST: CPT | Mod: 59

## 2024-09-05 PROCEDURE — 76816 OB US FOLLOW-UP PER FETUS: CPT

## 2024-09-05 PROCEDURE — 0502F SUBSEQUENT PRENATAL CARE: CPT

## 2024-09-12 ENCOUNTER — APPOINTMENT (OUTPATIENT)
Dept: OBGYN | Facility: CLINIC | Age: 29
End: 2024-09-12
Payer: COMMERCIAL

## 2024-09-12 ENCOUNTER — APPOINTMENT (OUTPATIENT)
Dept: MATERNAL FETAL MEDICINE | Facility: CLINIC | Age: 29
End: 2024-09-12
Payer: COMMERCIAL

## 2024-09-12 ENCOUNTER — ASOB RESULT (OUTPATIENT)
Age: 29
End: 2024-09-12

## 2024-09-12 PROCEDURE — 76818 FETAL BIOPHYS PROFILE W/NST: CPT

## 2024-09-12 PROCEDURE — G0108 DIAB MANAGE TRN  PER INDIV: CPT | Mod: 95

## 2024-09-12 PROCEDURE — 0502F SUBSEQUENT PRENATAL CARE: CPT

## 2024-09-19 ENCOUNTER — APPOINTMENT (OUTPATIENT)
Dept: OBGYN | Facility: CLINIC | Age: 29
End: 2024-09-19
Payer: COMMERCIAL

## 2024-09-19 ENCOUNTER — APPOINTMENT (OUTPATIENT)
Dept: MATERNAL FETAL MEDICINE | Facility: CLINIC | Age: 29
End: 2024-09-19
Payer: COMMERCIAL

## 2024-09-19 ENCOUNTER — ASOB RESULT (OUTPATIENT)
Age: 29
End: 2024-09-19

## 2024-09-19 PROCEDURE — 76818 FETAL BIOPHYS PROFILE W/NST: CPT | Mod: 59

## 2024-09-19 PROCEDURE — 0502F SUBSEQUENT PRENATAL CARE: CPT

## 2024-09-19 PROCEDURE — G0108 DIAB MANAGE TRN  PER INDIV: CPT | Mod: 95

## 2024-09-19 PROCEDURE — 76816 OB US FOLLOW-UP PER FETUS: CPT

## 2024-09-24 ENCOUNTER — APPOINTMENT (OUTPATIENT)
Dept: OBGYN | Facility: CLINIC | Age: 29
End: 2024-09-24
Payer: COMMERCIAL

## 2024-09-24 PROCEDURE — 76816 OB US FOLLOW-UP PER FETUS: CPT

## 2024-09-24 PROCEDURE — 0502F SUBSEQUENT PRENATAL CARE: CPT

## 2024-09-24 PROCEDURE — 76818 FETAL BIOPHYS PROFILE W/NST: CPT | Mod: 59

## 2024-09-27 ENCOUNTER — APPOINTMENT (OUTPATIENT)
Dept: OBGYN | Facility: CLINIC | Age: 29
End: 2024-09-27
Payer: COMMERCIAL

## 2024-09-27 PROCEDURE — 90471 IMMUNIZATION ADMIN: CPT

## 2024-09-27 PROCEDURE — 90715 TDAP VACCINE 7 YRS/> IM: CPT

## 2024-10-01 ENCOUNTER — ASOB RESULT (OUTPATIENT)
Age: 29
End: 2024-10-01

## 2024-10-01 ENCOUNTER — APPOINTMENT (OUTPATIENT)
Dept: MATERNAL FETAL MEDICINE | Facility: CLINIC | Age: 29
End: 2024-10-01
Payer: COMMERCIAL

## 2024-10-01 PROCEDURE — G0108 DIAB MANAGE TRN  PER INDIV: CPT | Mod: 95

## 2024-10-03 ENCOUNTER — APPOINTMENT (OUTPATIENT)
Dept: OBGYN | Facility: CLINIC | Age: 29
End: 2024-10-03
Payer: COMMERCIAL

## 2024-10-03 PROCEDURE — 0502F SUBSEQUENT PRENATAL CARE: CPT

## 2024-10-03 PROCEDURE — 76818 FETAL BIOPHYS PROFILE W/NST: CPT | Mod: 59

## 2024-10-03 PROCEDURE — 76816 OB US FOLLOW-UP PER FETUS: CPT

## 2024-10-08 ENCOUNTER — OUTPATIENT (OUTPATIENT)
Dept: OUTPATIENT SERVICES | Facility: HOSPITAL | Age: 29
LOS: 1 days | End: 2024-10-08
Payer: COMMERCIAL

## 2024-10-08 VITALS — OXYGEN SATURATION: 97 % | HEART RATE: 123 BPM

## 2024-10-08 DIAGNOSIS — O26.899 OTHER SPECIFIED PREGNANCY RELATED CONDITIONS, UNSPECIFIED TRIMESTER: ICD-10-CM

## 2024-10-08 PROCEDURE — 59025 FETAL NON-STRESS TEST: CPT | Mod: 26

## 2024-10-08 PROCEDURE — 99221 1ST HOSP IP/OBS SF/LOW 40: CPT | Mod: 25

## 2024-10-08 PROCEDURE — 59025 FETAL NON-STRESS TEST: CPT

## 2024-10-08 PROCEDURE — G0463: CPT

## 2024-10-08 NOTE — OB RN TRIAGE NOTE - FALL HARM RISK - UNIVERSAL INTERVENTIONS
Bed in lowest position, wheels locked, appropriate side rails in place/Call bell, personal items and telephone in reach/Instruct patient to call for assistance before getting out of bed or chair/Non-slip footwear when patient is out of bed/[place] to call system/Physically safe environment - no spills, clutter or unnecessary equipment/Purposeful Proactive Rounding/Room/bathroom lighting operational, light cord in reach

## 2024-10-09 VITALS — DIASTOLIC BLOOD PRESSURE: 70 MMHG | SYSTOLIC BLOOD PRESSURE: 115 MMHG | TEMPERATURE: 98 F | HEART RATE: 96 BPM

## 2024-10-09 NOTE — OB PROVIDER TRIAGE NOTE - HISTORY OF PRESENT ILLNESS
PA Triage Note    Subjective  HPI: 29F  36.5wks gestational age presents for complaints of decreased fetal movement and vaginal bleeding. Pt reports having +FM. -LOF. -CTXs. -VB. Pt denies any other concerns.    – PNC: Denies prenatal issues. GBS _.  EFW _g by sono.  – OBHx:   – GynHx: denies  – PMH: denies  – PSH: denies  – Psych: denies   – Social: denies   – Meds: PNV   – Allergies: NKDA  – Will accept blood transfusions? Yes    Objective  – VS  T(C): 36.5 (10-08-24 @ 23:12)  HR: 91 (10-09-24 @ 00:56)  BP: 121/79 (10-08-24 @ 23:16)  RR: 18 (10-08-24 @ 23:16)  SpO2: 98% (10-09-24 @ 00:56)  – PE:   CV: RRR  Pulm: breathing comfortably on RA  Abd: gravid, nontender  Extr: moving all extremities with ease  – FS:   – Spec: pooling, nitrazine, ferning, bleeding,  (lesions if patient with HSV2 history)  – VE: //  – FHT: baseline 1, mod variability, +accels, -decels  – [place]: qmin  – EFW: _g by sono  – Sono: vertex    Assessment  yoF GP gestational age presents for _.     Plan  -BPP     Discussed with Dr. Yamilka Quiñones PA-C PA Triage Note    Subjective  HPI: 29F  36.5wks gestational age presents for complaints of decreased fetal movement and vaginal bleeding. Pt reports having an episode of vaginal bleeding 1hr ago when she went to the toilet and noticed blood when wiping her vagina. Pt states having a history of "idiopathic rectal bleeding" and is certain that the bleeding is not from her rectum. Pt denies noticing any continuous or intermittent trickling of blood since. A picture of the toilet paper revealed two spots of dark red blood. Additionally, pt reports feeling less fetal movement compared to usual. -LOF. -CTXs. Pt denies chest pain, palpitations, SOB, or any other concerns.    – PNC: GDMA2 on Metformin 1000mg BID. History of anemia prior to pregnancy and previously on Fe infusions due to intolerance of PO Fe from constipation, no Fe during this pregnancy with plan to have Fe infusion postpartum. GBS positive. EFW 3100g extrapolated from sono in office 1 week ago.   – OBHx: Primigravida. Denies terminations, miscarriages, ectopic pregnancies.   – GynHx: PCOS. Denies fibroids, abnormal pap smears, STI  – PMH: Childhood asthma (does not carry an inhaler, denies hospitalizations, exacerbations, intubations). History of idiopathic rectal bleeding (negative work up with GI).  – PSH: L ankle arthroscopy .   – Psych: Anxiety (previously on Xanax PRN, none taken during this pregnancy). Denies depression.   – Social: Denies T/E/D  – Meds: PNV, Asp 81/126mg, Metformin 1000mg BID, Colace PRN  – Allergies: NKDA  – Will accept blood transfusions? Yes

## 2024-10-09 NOTE — OB PROVIDER TRIAGE NOTE - NS_OBGYNHISTORY_OBGYN_ALL_OB_FT
– PNC: GDMA2 on Metformin 1000mg BID. History of anemia prior to pregnancy and previously on Fe infusions due to intolerance of PO Fe from constipation, no Fe during this pregnancy with plan to have Fe infusion postpartum. GBS positive. EFW 3100g extrapolated from sono in office 1 week ago.   – OBHx: Primigravida. Denies terminations, miscarriages, ectopic pregnancies.   – GynHx: PCOS. Denies fibroids, abnormal pap smears, STI

## 2024-10-09 NOTE — OB PROVIDER TRIAGE NOTE - NSOBPROVIDERNOTE_OBGYN_ALL_OB_FT
Assessment/Plan  29F  36.5wks gestational age evaluated for complaints of decreased fetal movement and vaginal bleeding.     -Speculum exam: no vaginal bleeding noted  -Sono: vertex, BPP 8/8, DARYA 12.45, fetal movement noted on US and pt verbalized feeling movement   -NST: reactive and reassuring  -Pt to be discharged home with strict return precautions  -Pt to follow up in office as scheduled on 10/11/24  -Pt verbalized understanding and agreement to plan, all questions answered    Discussed with Dr. Yamilka Quiñones PA-C

## 2024-10-10 ENCOUNTER — APPOINTMENT (OUTPATIENT)
Dept: OBGYN | Facility: CLINIC | Age: 29
End: 2024-10-10
Payer: COMMERCIAL

## 2024-10-10 PROCEDURE — 76818 FETAL BIOPHYS PROFILE W/NST: CPT

## 2024-10-10 PROCEDURE — 0502F SUBSEQUENT PRENATAL CARE: CPT

## 2024-10-11 DIAGNOSIS — O46.93 ANTEPARTUM HEMORRHAGE, UNSPECIFIED, THIRD TRIMESTER: ICD-10-CM

## 2024-10-11 DIAGNOSIS — O99.013 ANEMIA COMPLICATING PREGNANCY, THIRD TRIMESTER: ICD-10-CM

## 2024-10-11 DIAGNOSIS — O99.513 DISEASES OF THE RESPIRATORY SYSTEM COMPLICATING PREGNANCY, THIRD TRIMESTER: ICD-10-CM

## 2024-10-11 DIAGNOSIS — O36.8130 DECREASED FETAL MOVEMENTS, THIRD TRIMESTER, NOT APPLICABLE OR UNSPECIFIED: ICD-10-CM

## 2024-10-11 DIAGNOSIS — O99.283 ENDOCRINE, NUTRITIONAL AND METABOLIC DISEASES COMPLICATING PREGNANCY, THIRD TRIMESTER: ICD-10-CM

## 2024-10-11 DIAGNOSIS — O24.415 GESTATIONAL DIABETES MELLITUS IN PREGNANCY, CONTROLLED BY ORAL HYPOGLYCEMIC DRUGS: ICD-10-CM

## 2024-10-11 DIAGNOSIS — Z3A.36 36 WEEKS GESTATION OF PREGNANCY: ICD-10-CM

## 2024-10-11 DIAGNOSIS — D64.9 ANEMIA, UNSPECIFIED: ICD-10-CM

## 2024-10-11 DIAGNOSIS — J45.909 UNSPECIFIED ASTHMA, UNCOMPLICATED: ICD-10-CM

## 2024-10-14 ENCOUNTER — APPOINTMENT (OUTPATIENT)
Dept: MATERNAL FETAL MEDICINE | Facility: CLINIC | Age: 29
End: 2024-10-14
Payer: COMMERCIAL

## 2024-10-14 ENCOUNTER — ASOB RESULT (OUTPATIENT)
Age: 29
End: 2024-10-14

## 2024-10-14 PROCEDURE — G0108 DIAB MANAGE TRN  PER INDIV: CPT | Mod: 95

## 2024-10-15 ENCOUNTER — APPOINTMENT (OUTPATIENT)
Dept: OBGYN | Facility: CLINIC | Age: 29
End: 2024-10-15

## 2024-10-15 ENCOUNTER — APPOINTMENT (OUTPATIENT)
Dept: OBGYN | Facility: CLINIC | Age: 29
End: 2024-10-15
Payer: COMMERCIAL

## 2024-10-15 PROCEDURE — 59025 FETAL NON-STRESS TEST: CPT

## 2024-10-15 PROCEDURE — 99213 OFFICE O/P EST LOW 20 MIN: CPT | Mod: 25

## 2024-10-17 ENCOUNTER — APPOINTMENT (OUTPATIENT)
Dept: OBGYN | Facility: CLINIC | Age: 29
End: 2024-10-17
Payer: COMMERCIAL

## 2024-10-17 PROCEDURE — 76818 FETAL BIOPHYS PROFILE W/NST: CPT | Mod: 59

## 2024-10-17 PROCEDURE — 76816 OB US FOLLOW-UP PER FETUS: CPT

## 2024-10-17 PROCEDURE — 0502F SUBSEQUENT PRENATAL CARE: CPT

## 2024-10-21 ENCOUNTER — APPOINTMENT (OUTPATIENT)
Dept: OBGYN | Facility: CLINIC | Age: 29
End: 2024-10-21
Payer: COMMERCIAL

## 2024-10-21 PROCEDURE — 76816 OB US FOLLOW-UP PER FETUS: CPT

## 2024-10-21 PROCEDURE — 76818 FETAL BIOPHYS PROFILE W/NST: CPT | Mod: 59

## 2024-10-21 PROCEDURE — 0502F SUBSEQUENT PRENATAL CARE: CPT

## 2024-10-22 ENCOUNTER — OUTPATIENT (OUTPATIENT)
Dept: OUTPATIENT SERVICES | Facility: HOSPITAL | Age: 29
LOS: 1 days | End: 2024-10-22
Payer: COMMERCIAL

## 2024-10-22 VITALS — DIASTOLIC BLOOD PRESSURE: 78 MMHG | SYSTOLIC BLOOD PRESSURE: 128 MMHG | HEART RATE: 96 BPM

## 2024-10-22 DIAGNOSIS — O26.899 OTHER SPECIFIED PREGNANCY RELATED CONDITIONS, UNSPECIFIED TRIMESTER: ICD-10-CM

## 2024-10-22 PROCEDURE — 59025 FETAL NON-STRESS TEST: CPT | Mod: 26

## 2024-10-22 PROCEDURE — G0463: CPT

## 2024-10-22 PROCEDURE — 59025 FETAL NON-STRESS TEST: CPT

## 2024-10-22 PROCEDURE — 99221 1ST HOSP IP/OBS SF/LOW 40: CPT | Mod: 25

## 2024-10-22 NOTE — OB RN TRIAGE NOTE - HOW OFTEN DO YOU HAVE A DRINK CONTAINING ALCOHOL?
2021  EMPLOYEE INFORMATION: EMPLOYER INFORMATION:   NAME: Neha CHAVARRIA    : 1998 [phone]    DATE OF INJURY/EVENT: 2021            [facility]   Treating Provider: Malik Ordoñez PA-C  Time In:  3:50 PM Time Out:  4:25 PM      DIAGNOSIS:   1. Partial thickness burn of left forearm, initial encounter      STATUS: This injury is determined to be WORK RELATED.    RETURN TO WORK:Employee may return to work with restrictions.   Return Date: 2021            RESTRICTIONS: Restrictions are to be followed at work and at home.  Restrictions are in effect until next follow-up visit.  Needs to keep the bandage and dressing on left arm clean dry and intact.  No lifting, carrying, pushing or pulling more than 3-4 lb with the left arm, minimal use of the left hand and arm.     TREATMENT PLAN:   Medications for this injury/condition: Anti-inflammatories, Advil (Ibuprofen) 800 mg ( take 4  200mg tablets) 3 times a day with food.  Stop taking if side effects develop.    May take Tylenol  as needed for breakthrough pain (You can take up to two of the 500 mg tablets 4 times a day.     Referral/Consult: None  Diagnostic Testing: None       Instructions: Continue dressing changes as discussed.  Continue to monitor for signs of infection and call us with any issues.    NEXT RETURN VISIT:  Monday, 2021 @ 2:00pm.  Thank you for the privilege of providing medical care for this injury/condition.  If there are any questions, please call the occupational health clinic at Dept: [phone].    Electronically signed on 2021 at 4:21 PM by:   Malik Ordoñez PA-C   [facility]    The physician below agrees with the plan and restrictions placed on the patient by the provider above.  Sarah Bejarano DO    
Never

## 2024-10-22 NOTE — OB RN TRIAGE NOTE - CHIEF COMPLAINT QUOTE
patient states "having leaking breasts, a headache, and an overactive fetal movement in the last two hours"

## 2024-10-22 NOTE — OB RN TRIAGE NOTE - NS_RELATIONSHIPOFSUPPORTPERSON_OBGYN_ALL_OB_FT
Chief Complaint   Patient presents with   • Office Visit     10 month follow up visit on Osteoporosis, unspecified osteoporosis type, unspecified pathological fracture presence     Kraig Ponce is a 71 year old male who comes in for evaluation of osteoporosis.     Kraig has been talking to his dentist who is ok with him receiving reclast per Kraig    The patient has had loss of height of approximately 2inches.  The patient denies any fractures.        The patient is taking Calcium supplement   he takes Vitamin D3  international units daily.  The patient has never used glucocorticoids.  has never been on Actos   He has been on phenytoin since at least 1992      The patient denies use of antidepressants.  The patient is not on medications for GERD.  The patient denies excessive smoking or excessive alcohol abuse.                  Head injury followed by grand mal seizure not on anything after first seizure as they thought it was the injury     9 half years later had a second grand mal seizure then phenytoin was started and continued     He has not been on any other alternate  He was seeing Dr ARELLANO  Constitutional Problem(s):  Negative    Respiratory problem(s):  Negative  Cardiovascular problem(s):  Negative  Hematologic problem(s):  Negative  Immunologic Problem(s):  Negative  Gastrointestinal problem(s):  Negative  Genitourinary problem(s):  Negative  Musculoskeletal problem(s):  Negative  Neurological problem(s):  Negative  Endocrine problem(s):  Negative  Skin problem(s):  Negative      Visit Vitals  /56 (BP Location: RUE - Right upper extremity, Patient Position: Sitting, Cuff Size: Regular)   Pulse 70   Ht 6' 2\" (1.88 m)   Wt 88 kg (194 lb)   SpO2 98%   BMI 24.91 kg/m²       PHYSICAL EXAM:  General: alert, in no acute distress  Skin: warm with normal turgor  Head: atraumatic and normocephalic    Neck: supple with no significant adenopathy, no thyromegaly  Lungs: clear to auscultation, no wheezing  or rhonchi noted  Heart: regular rhythm, no murmur present  Abdomen: soft, no guarding or masses, no organomegaly or CVA tenderness      Osteoporosis, unspecified osteoporosis type, unspecified pathological fracture presence  (primary encounter diagnosis)  Adverse effect of phenytoin, sequela  Post-traumatic seizures (CMD)        He is agreeable to reclast  Has a dental crown being placed  He will have the crown on July 31  He will need to have the infusion about aug 31  Labs on aug 21     Counseled pt that he may have flu like symptoms       Osteoporosis    Currently, she has osteoporosis based on DEXA scan.  We discussed the role of antiresorptive therapy in fracture risk reduction. Discussed alternative options including Evista, Forteo/Tymlos.  We also discussed the intended initial duration of antiresorptive therapy 3-5 years with zoledronic acid, more years with denosumab.  Consideration of a drug holiday after the initial treatment course for low risk persons (excluding denosumab).  Extended therapy with alendronate for 10 years, zoledronic acid for 6 years or denosumab for longer durations (or \"locking\" bone loss by giving one dose of relcast at the end of denosumab therapy) may be warranted based on risk profile.   There is a concern for increased incidence of vertebral fracture(s) associated with denosumab if therapy is suddenly stopped and pt does not receive a following therapy afterwards. Therefore, choosing denosumab will be a life-long choice or will need a subsequent therapy at the end of denosumab cycle to protect bone further. This was discussed in detail with pt.  We reviewed the rare, but significant ADRs of the antiresorptive agents (including denosumab) to include osteonecrosis of the jaw, and atypical femur fractures, as per below.  If Reclast is chosen, muscle/joint pain is a concern, and flu-like sxs could happen also. Advised to take tylenol q6-8 hr, starting immediately before  zoledronic  acid and upto 2 days after therapy.    With bisphosphonates,  whereas one analysis of benefit vs risk for bisphosphonate treatment of 3 years showed that treating 1000 osteoporotic women would prevent 100 fractures, including 11 hip fractures, while causing about 0.1 AFF (J Clin Endocrinol Metab, May 2019, 104(5):6237-4257)      rtc yearly     spouse

## 2024-10-22 NOTE — OB RN TRIAGE NOTE - NSDCTEMPFAHRENHEIT_OBGYN_A_OB_CAL
----- Message from STEPHON Persaud 98.4

## 2024-10-23 VITALS — SYSTOLIC BLOOD PRESSURE: 125 MMHG | DIASTOLIC BLOOD PRESSURE: 80 MMHG | HEART RATE: 121 BPM | TEMPERATURE: 98 F

## 2024-10-23 NOTE — OB PROVIDER TRIAGE NOTE - NSOBPROVIDERNOTE_OBGYN_ALL_OB_FT
30yo  @38 weeks and 5 days (MARLA:10/31/24) presents with increased fetal movement.   Plan:  -BPP: , vertex, DARYA:13.1  -FHT reactive  -Patient safe to be discharged home and educated on labor return precautions  -Patient has a follow up appointment in the office on 10/24/24  -Discussed above plan with Dr. Steele

## 2024-10-23 NOTE — OB PROVIDER TRIAGE NOTE - HISTORY OF PRESENT ILLNESS
30yo  @38 weeks and 5 days (MARLA:10/31/24) presents to triage with increased fetal movement this evening. Patient currently denies vaginal bleeding, increased leakage of fluids, or painful contractions at this time.   GBS: Positive   EFW: 3600g  OB: Primigravida   GYN: PCOS   Allergies: NKDA   Medical Hx: Gestational diabetes on metformin   Medications: Prenatal vitamins, ASA, 2000mg metformin po qd   Surgical Hx: Denies   Social Hx: Denies x3, no anxiety or depression

## 2024-10-23 NOTE — OB PROVIDER TRIAGE NOTE - NSHPPHYSICALEXAM_GEN_ALL_CORE
CV: S1,S2  Pulmonary: Clear to auscultation bilaterally    Abdomen: Gravid abdomen  Extremities: Nontender to palpation bilaterally     EFM: 130hr, moderate variability, +accelerations, -decelerations   TOCO: Uterine Irritability   BSUS: Vertex, BPP: 8/8, DARYA: 13.1

## 2024-10-24 ENCOUNTER — ASOB RESULT (OUTPATIENT)
Age: 29
End: 2024-10-24

## 2024-10-24 ENCOUNTER — APPOINTMENT (OUTPATIENT)
Dept: OBGYN | Facility: CLINIC | Age: 29
End: 2024-10-24
Payer: COMMERCIAL

## 2024-10-24 ENCOUNTER — APPOINTMENT (OUTPATIENT)
Dept: MATERNAL FETAL MEDICINE | Facility: CLINIC | Age: 29
End: 2024-10-24
Payer: COMMERCIAL

## 2024-10-24 PROCEDURE — 76818 FETAL BIOPHYS PROFILE W/NST: CPT

## 2024-10-24 PROCEDURE — 0502F SUBSEQUENT PRENATAL CARE: CPT

## 2024-10-24 PROCEDURE — G0108 DIAB MANAGE TRN  PER INDIV: CPT | Mod: 95

## 2024-10-25 DIAGNOSIS — E28.2 POLYCYSTIC OVARIAN SYNDROME: ICD-10-CM

## 2024-10-25 DIAGNOSIS — Z3A.38 38 WEEKS GESTATION OF PREGNANCY: ICD-10-CM

## 2024-10-25 DIAGNOSIS — O99.283 ENDOCRINE, NUTRITIONAL AND METABOLIC DISEASES COMPLICATING PREGNANCY, THIRD TRIMESTER: ICD-10-CM

## 2024-10-25 DIAGNOSIS — O24.415 GESTATIONAL DIABETES MELLITUS IN PREGNANCY, CONTROLLED BY ORAL HYPOGLYCEMIC DRUGS: ICD-10-CM

## 2024-10-25 DIAGNOSIS — O36.8930 MATERNAL CARE FOR OTHER SPECIFIED FETAL PROBLEMS, THIRD TRIMESTER, NOT APPLICABLE OR UNSPECIFIED: ICD-10-CM

## 2024-10-28 ENCOUNTER — APPOINTMENT (OUTPATIENT)
Dept: OBGYN | Facility: CLINIC | Age: 29
End: 2024-10-28
Payer: COMMERCIAL

## 2024-10-28 ENCOUNTER — OUTPATIENT (OUTPATIENT)
Dept: OUTPATIENT SERVICES | Facility: HOSPITAL | Age: 29
LOS: 1 days | End: 2024-10-28
Payer: COMMERCIAL

## 2024-10-28 VITALS — DIASTOLIC BLOOD PRESSURE: 89 MMHG | SYSTOLIC BLOOD PRESSURE: 146 MMHG | HEART RATE: 90 BPM

## 2024-10-28 VITALS — HEART RATE: 107 BPM | DIASTOLIC BLOOD PRESSURE: 86 MMHG | SYSTOLIC BLOOD PRESSURE: 131 MMHG

## 2024-10-28 DIAGNOSIS — O26.899 OTHER SPECIFIED PREGNANCY RELATED CONDITIONS, UNSPECIFIED TRIMESTER: ICD-10-CM

## 2024-10-28 PROCEDURE — G0463: CPT

## 2024-10-28 PROCEDURE — 99221 1ST HOSP IP/OBS SF/LOW 40: CPT | Mod: 25

## 2024-10-28 PROCEDURE — 76818 FETAL BIOPHYS PROFILE W/NST: CPT

## 2024-10-28 PROCEDURE — 0502F SUBSEQUENT PRENATAL CARE: CPT

## 2024-10-28 PROCEDURE — 59025 FETAL NON-STRESS TEST: CPT

## 2024-10-28 PROCEDURE — 59025 FETAL NON-STRESS TEST: CPT | Mod: 26

## 2024-10-28 NOTE — OB RN TRIAGE NOTE - HOW PATIENT ADDRESSED, PROFILE
[place] Propranolol Counseling:  I discussed with the patient the risks of propranolol including but not limited to low heart rate, low blood pressure, low blood sugar, restlessness and increased cold sensitivity. They should call the office if they experience any of these side effects.

## 2024-10-28 NOTE — OB PROVIDER TRIAGE NOTE - NSHPPHYSICALEXAM_GEN_ALL_CORE
VS:  T 36.8    KEREN 131/86  RR 18    Gen: alert, NAD  Abd: gravid, soft, non-tender  Ext: wwp, b/L LE edema, nontender    SVE: 1/80/-3, unchanged     EFM: baseline 135, mod variability, +accels, no decels  [place]: irregular contractions   BSUS: vertex, BPP 8/8, DARYA 19cm

## 2024-10-28 NOTE — OB PROVIDER TRIAGE NOTE - HISTORY OF PRESENT ILLNESS
OB Triage Note    28yo  @ 39w4d, MARLA 10/31 presents to triage sent from the office for prolonged monitoring due to variables on office NST. Pt with GDMA2. Has a phobia with needles, does not take FS (does not want her FS taken or IV/labs draw) and is on metformin 1000mg BID. Pt has a dexcom and FS are wnl. She is scheduled for IOL tomorrow night. Notes mild contraction pain that is unchanged, VE in the office . Denies VB or LOF. +FM.       PNC: Dr. Prather  AP Issues:   1-GDMA2  - on metformin 1000mg BID  - last EFW 8lbs  PNL: GBS positive-for ampicillin    OBHx:   GynHx: +PCOS. Denies abnormal Paps, STIs, cysts, fibroids   PMH: denies  PSH: denies  Meds: PNV, bASA, metformin 1000mg BID  Allergies: NKDA  Social: denies alcohol/tobacco/drug use in pregnancy   Psych: +anxiety, was previously on xanax prior to pregnancy     Will accept blood transfusions: Yes

## 2024-10-28 NOTE — OB PROVIDER TRIAGE NOTE - NSOBPROVIDERNOTE_OBGYN_ALL_OB_FT
28yo  @ 39w4d MARLA 10/31 presenting to triage sent from the office for prolonged monitoring due to variables on NST. Here in triage, tracing has been reactive x 1h and 8/8 BPP. VE unchanged. Ok to discharge home, pt to return tomorrow for scheduled IOL.     Patient to be discharged home.   Labor precautions discussed with pt, advised to return if LOF, ctxs, VB, decreased FM.   Follow-up tomorrow for IOL.     Discussed with Shankar Paz CNM

## 2024-10-29 ENCOUNTER — OUTPATIENT (OUTPATIENT)
Dept: OUTPATIENT SERVICES | Facility: HOSPITAL | Age: 29
LOS: 1 days | End: 2024-10-29
Payer: COMMERCIAL

## 2024-10-29 ENCOUNTER — INPATIENT (INPATIENT)
Facility: HOSPITAL | Age: 29
LOS: 1 days | Discharge: HOME | DRG: 951 | End: 2024-10-31
Attending: OBSTETRICS & GYNECOLOGY | Admitting: OBSTETRICS & GYNECOLOGY
Payer: COMMERCIAL

## 2024-10-29 VITALS — HEART RATE: 95 BPM | OXYGEN SATURATION: 93 %

## 2024-10-29 VITALS — OXYGEN SATURATION: 97 % | HEART RATE: 110 BPM

## 2024-10-29 VITALS — OXYGEN SATURATION: 99 %

## 2024-10-29 DIAGNOSIS — O26.899 OTHER SPECIFIED PREGNANCY RELATED CONDITIONS, UNSPECIFIED TRIMESTER: ICD-10-CM

## 2024-10-29 DIAGNOSIS — Z34.80 ENCOUNTER FOR SUPERVISION OF OTHER NORMAL PREGNANCY, UNSPECIFIED TRIMESTER: ICD-10-CM

## 2024-10-29 LAB
ALBUMIN SERPL ELPH-MCNC: 3.9 G/DL — SIGNIFICANT CHANGE UP (ref 3.3–5)
ALP SERPL-CCNC: 229 U/L — HIGH (ref 40–120)
ALT FLD-CCNC: 8 U/L — LOW (ref 10–45)
ANION GAP SERPL CALC-SCNC: 19 MMOL/L — HIGH (ref 5–17)
AST SERPL-CCNC: 12 U/L — SIGNIFICANT CHANGE UP (ref 10–40)
BASOPHILS # BLD AUTO: 0.04 K/UL — SIGNIFICANT CHANGE UP (ref 0–0.2)
BASOPHILS NFR BLD AUTO: 0.3 % — SIGNIFICANT CHANGE UP (ref 0–2)
BILIRUB SERPL-MCNC: 0.2 MG/DL — SIGNIFICANT CHANGE UP (ref 0.2–1.2)
BLD GP AB SCN SERPL QL: NEGATIVE — SIGNIFICANT CHANGE UP
BUN SERPL-MCNC: 10 MG/DL — SIGNIFICANT CHANGE UP (ref 7–23)
CALCIUM SERPL-MCNC: 9.6 MG/DL — SIGNIFICANT CHANGE UP (ref 8.4–10.5)
CHLORIDE SERPL-SCNC: 97 MMOL/L — SIGNIFICANT CHANGE UP (ref 96–108)
CO2 SERPL-SCNC: 17 MMOL/L — LOW (ref 22–31)
CREAT SERPL-MCNC: 0.45 MG/DL — LOW (ref 0.5–1.3)
EGFR: 132 ML/MIN/1.73M2 — SIGNIFICANT CHANGE UP
EOSINOPHIL # BLD AUTO: 0 K/UL — SIGNIFICANT CHANGE UP (ref 0–0.5)
EOSINOPHIL NFR BLD AUTO: 0 % — SIGNIFICANT CHANGE UP (ref 0–6)
GLUCOSE BLDC GLUCOMTR-MCNC: 101 MG/DL — HIGH (ref 70–99)
GLUCOSE BLDC GLUCOMTR-MCNC: 107 MG/DL — HIGH (ref 70–99)
GLUCOSE BLDC GLUCOMTR-MCNC: 88 MG/DL — SIGNIFICANT CHANGE UP (ref 70–99)
GLUCOSE SERPL-MCNC: 99 MG/DL — SIGNIFICANT CHANGE UP (ref 70–99)
HCT VFR BLD CALC: 40.6 % — SIGNIFICANT CHANGE UP (ref 34.5–45)
HGB BLD-MCNC: 12.3 G/DL — SIGNIFICANT CHANGE UP (ref 11.5–15.5)
IMM GRANULOCYTES NFR BLD AUTO: 1.6 % — HIGH (ref 0–0.9)
LDH SERPL L TO P-CCNC: 159 U/L — SIGNIFICANT CHANGE UP (ref 50–242)
LYMPHOCYTES # BLD AUTO: 0.99 K/UL — LOW (ref 1–3.3)
LYMPHOCYTES # BLD AUTO: 7.2 % — LOW (ref 13–44)
MCHC RBC-ENTMCNC: 23.9 PG — LOW (ref 27–34)
MCHC RBC-ENTMCNC: 30.3 GM/DL — LOW (ref 32–36)
MCV RBC AUTO: 78.8 FL — LOW (ref 80–100)
MONOCYTES # BLD AUTO: 0.48 K/UL — SIGNIFICANT CHANGE UP (ref 0–0.9)
MONOCYTES NFR BLD AUTO: 3.5 % — SIGNIFICANT CHANGE UP (ref 2–14)
NEUTROPHILS # BLD AUTO: 12.07 K/UL — HIGH (ref 1.8–7.4)
NEUTROPHILS NFR BLD AUTO: 87.4 % — HIGH (ref 43–77)
NRBC # BLD: 0 /100 WBCS — SIGNIFICANT CHANGE UP (ref 0–0)
PLATELET # BLD AUTO: 256 K/UL — SIGNIFICANT CHANGE UP (ref 150–400)
POTASSIUM SERPL-MCNC: 3.7 MMOL/L — SIGNIFICANT CHANGE UP (ref 3.5–5.3)
POTASSIUM SERPL-SCNC: 3.7 MMOL/L — SIGNIFICANT CHANGE UP (ref 3.5–5.3)
PROT SERPL-MCNC: 7.8 G/DL — SIGNIFICANT CHANGE UP (ref 6–8.3)
RBC # BLD: 5.15 M/UL — SIGNIFICANT CHANGE UP (ref 3.8–5.2)
RBC # FLD: 16 % — HIGH (ref 10.3–14.5)
RH IG SCN BLD-IMP: POSITIVE — SIGNIFICANT CHANGE UP
SODIUM SERPL-SCNC: 133 MMOL/L — LOW (ref 135–145)
T PALLIDUM AB TITR SER: NEGATIVE — SIGNIFICANT CHANGE UP
URATE SERPL-MCNC: 5.5 MG/DL — SIGNIFICANT CHANGE UP (ref 2.5–7)
WBC # BLD: 13.8 K/UL — HIGH (ref 3.8–10.5)
WBC # FLD AUTO: 13.8 K/UL — HIGH (ref 3.8–10.5)

## 2024-10-29 PROCEDURE — G0463: CPT

## 2024-10-29 PROCEDURE — 99212 OFFICE O/P EST SF 10 MIN: CPT

## 2024-10-29 PROCEDURE — 59400 OBSTETRICAL CARE: CPT

## 2024-10-29 RX ORDER — CITRIC ACID/SODIUM CITRATE 334-500MG
15 SOLUTION, ORAL ORAL EVERY 6 HOURS
Refills: 0 | Status: DISCONTINUED | OUTPATIENT
Start: 2024-10-29 | End: 2024-10-29

## 2024-10-29 RX ORDER — DIPHENHYDRAMINE HCL 12.5MG/5ML
25 ELIXIR ORAL EVERY 6 HOURS
Refills: 0 | Status: DISCONTINUED | OUTPATIENT
Start: 2024-10-29 | End: 2024-10-31

## 2024-10-29 RX ORDER — CLOSTRIDIUM TETANI TOXOID ANTIGEN (FORMALDEHYDE INACTIVATED), CORYNEBACTERIUM DIPHTHERIAE TOXOID ANTIGEN (FORMALDEHYDE INACTIVATED), BORDETELLA PERTUSSIS TOXOID ANTIGEN (GLUTARALDEHYDE INACTIVATED), BORDETELLA PERTUSSIS FILAMENTOUS HEMAGGLUTININ ANTIGEN (FORMALDEHYDE INACTIVATED), BORDETELLA PERTUSSIS PERTACTIN ANTIGEN, AND BORDETELLA PERTUSSIS FIMBRIAE 2/3 ANTIGEN 5; 2; 2.5; 5; 3; 5 [LF]/.5ML; [LF]/.5ML; UG/.5ML; UG/.5ML; UG/.5ML; UG/.5ML
0.5 INJECTION, SUSPENSION INTRAMUSCULAR ONCE
Refills: 0 | Status: DISCONTINUED | OUTPATIENT
Start: 2024-10-29 | End: 2024-10-31

## 2024-10-29 RX ORDER — INFLUENZ VIR VAC TV P-SURF2003 15MCG/.5ML
0.5 SYRINGE (ML) INTRAMUSCULAR ONCE
Refills: 0 | Status: DISCONTINUED | OUTPATIENT
Start: 2024-10-29 | End: 2024-10-31

## 2024-10-29 RX ORDER — AMPICILLIN 2 G/1
1 INJECTION, POWDER, FOR SOLUTION INTRAVENOUS EVERY 4 HOURS
Refills: 0 | Status: DISCONTINUED | OUTPATIENT
Start: 2024-10-29 | End: 2024-10-29

## 2024-10-29 RX ORDER — AMPICILLIN 2 G/1
2 INJECTION, POWDER, FOR SOLUTION INTRAVENOUS ONCE
Refills: 0 | Status: COMPLETED | OUTPATIENT
Start: 2024-10-29 | End: 2024-10-29

## 2024-10-29 RX ORDER — DIBUCAINE 1 %
1 OINTMENT (GRAM) TOPICAL EVERY 6 HOURS
Refills: 0 | Status: DISCONTINUED | OUTPATIENT
Start: 2024-10-29 | End: 2024-10-31

## 2024-10-29 RX ORDER — KETOROLAC TROMETHAMINE 30 MG/ML
30 INJECTION INTRAMUSCULAR; INTRAVENOUS ONCE
Refills: 0 | Status: DISCONTINUED | OUTPATIENT
Start: 2024-10-29 | End: 2024-10-31

## 2024-10-29 RX ORDER — MODIFIED LANOLIN
1 OINTMENT (GRAM) TOPICAL EVERY 6 HOURS
Refills: 0 | Status: DISCONTINUED | OUTPATIENT
Start: 2024-10-29 | End: 2024-10-31

## 2024-10-29 RX ORDER — PRENATAL VIT/IRON FUM/FOLIC AC 60 MG-1 MG
1 TABLET ORAL DAILY
Refills: 0 | Status: DISCONTINUED | OUTPATIENT
Start: 2024-10-29 | End: 2024-10-31

## 2024-10-29 RX ORDER — BENZOCAINE 200 MG/G
1 GEL ORAL EVERY 6 HOURS
Refills: 0 | Status: DISCONTINUED | OUTPATIENT
Start: 2024-10-29 | End: 2024-10-31

## 2024-10-29 RX ORDER — OXYTOCIN IN D5W-0.2% SODIUM CL 15/250 ML
167 PLASTIC BAG, INJECTION (ML) INTRAVENOUS
Qty: 30 | Refills: 0 | Status: DISCONTINUED | OUTPATIENT
Start: 2024-10-29 | End: 2024-10-31

## 2024-10-29 RX ORDER — SODIUM CHLORIDE 9 MG/ML
1000 INJECTION, SOLUTION INTRAMUSCULAR; INTRAVENOUS; SUBCUTANEOUS
Refills: 0 | Status: DISCONTINUED | OUTPATIENT
Start: 2024-10-29 | End: 2024-10-31

## 2024-10-29 RX ORDER — PRAMOXINE HCL 1 %
1 CREAM (GRAM) RECTAL EVERY 4 HOURS
Refills: 0 | Status: DISCONTINUED | OUTPATIENT
Start: 2024-10-29 | End: 2024-10-31

## 2024-10-29 RX ORDER — MAGNESIUM HYDROXIDE 1200 MG/15ML
30 SUSPENSION ORAL
Refills: 0 | Status: DISCONTINUED | OUTPATIENT
Start: 2024-10-29 | End: 2024-10-31

## 2024-10-29 RX ORDER — KETOROLAC TROMETHAMINE 30 MG/ML
30 INJECTION INTRAMUSCULAR; INTRAVENOUS ONCE
Refills: 0 | Status: DISCONTINUED | OUTPATIENT
Start: 2024-10-29 | End: 2024-10-29

## 2024-10-29 RX ORDER — ACETAMINOPHEN 500 MG
975 TABLET ORAL ONCE
Refills: 0 | Status: COMPLETED | OUTPATIENT
Start: 2024-10-29 | End: 2024-10-29

## 2024-10-29 RX ORDER — ACETAMINOPHEN 500 MG
975 TABLET ORAL
Refills: 0 | Status: DISCONTINUED | OUTPATIENT
Start: 2024-10-29 | End: 2024-10-31

## 2024-10-29 RX ORDER — OXYCODONE HYDROCHLORIDE 30 MG/1
5 TABLET ORAL
Refills: 0 | Status: DISCONTINUED | OUTPATIENT
Start: 2024-10-29 | End: 2024-10-31

## 2024-10-29 RX ORDER — OXYTOCIN IN D5W-0.2% SODIUM CL 15/250 ML
4 PLASTIC BAG, INJECTION (ML) INTRAVENOUS
Qty: 30 | Refills: 0 | Status: DISCONTINUED | OUTPATIENT
Start: 2024-10-29 | End: 2024-10-31

## 2024-10-29 RX ORDER — SODIUM CHLORIDE 9 MG/ML
3 INJECTION, SOLUTION INTRAMUSCULAR; INTRAVENOUS; SUBCUTANEOUS EVERY 8 HOURS
Refills: 0 | Status: DISCONTINUED | OUTPATIENT
Start: 2024-10-29 | End: 2024-10-30

## 2024-10-29 RX ORDER — HYDROCORTISONE 1 %
1 OINTMENT (GRAM) TOPICAL EVERY 6 HOURS
Refills: 0 | Status: DISCONTINUED | OUTPATIENT
Start: 2024-10-29 | End: 2024-10-31

## 2024-10-29 RX ORDER — IBUPROFEN 200 MG
600 TABLET ORAL EVERY 6 HOURS
Refills: 0 | Status: COMPLETED | OUTPATIENT
Start: 2024-10-29 | End: 2025-09-27

## 2024-10-29 RX ORDER — SIMETHICONE 80 MG/1
80 TABLET, CHEWABLE ORAL EVERY 4 HOURS
Refills: 0 | Status: DISCONTINUED | OUTPATIENT
Start: 2024-10-29 | End: 2024-10-31

## 2024-10-29 RX ORDER — OXYCODONE HYDROCHLORIDE 30 MG/1
5 TABLET ORAL ONCE
Refills: 0 | Status: DISCONTINUED | OUTPATIENT
Start: 2024-10-29 | End: 2024-10-31

## 2024-10-29 RX ORDER — CHLORHEXIDINE GLUCONATE 40 MG/ML
1 SOLUTION TOPICAL DAILY
Refills: 0 | Status: DISCONTINUED | OUTPATIENT
Start: 2024-10-29 | End: 2024-10-29

## 2024-10-29 RX ADMIN — AMPICILLIN 200 GRAM(S): 2 INJECTION, POWDER, FOR SOLUTION INTRAVENOUS at 12:29

## 2024-10-29 RX ADMIN — Medication 125 MILLILITER(S): at 13:58

## 2024-10-29 RX ADMIN — Medication 4 MILLIUNIT(S)/MIN: at 16:15

## 2024-10-29 RX ADMIN — SODIUM CHLORIDE 125 MILLILITER(S): 9 INJECTION, SOLUTION INTRAMUSCULAR; INTRAVENOUS; SUBCUTANEOUS at 13:58

## 2024-10-29 RX ADMIN — Medication 975 MILLIGRAM(S): at 16:26

## 2024-10-29 RX ADMIN — KETOROLAC TROMETHAMINE 30 MILLIGRAM(S): 30 INJECTION INTRAMUSCULAR; INTRAVENOUS at 19:47

## 2024-10-29 RX ADMIN — AMPICILLIN 108 GRAM(S): 2 INJECTION, POWDER, FOR SOLUTION INTRAVENOUS at 16:26

## 2024-10-29 NOTE — OB PROVIDER DELIVERY SUMMARY - NSPROVIDERDELIVERYNOTE_OBGYN_ALL_OB_FT
RENATA NL MALE INFANT---DELAYED CORD CLAMPING X 60 SECS  APGARS 9/9   PLACENTA SPONT INTACT---UTERUS EXPLORED AND EMPTY  2ND DEG LAC REPAIRED   CC'S  PT TO RR IN GOOD CONDITION  JERAMY MOODY MD

## 2024-10-29 NOTE — OB RN DELIVERY SUMMARY - AMNIOTIC FLUID AMOUNT, LABOR
· Chronic  · Patient to work with occupational therapy and cognitive therapy  · With acute changes in mentation since in ICU - seen by neurology - underwent MRI brain,vEEG but no acute abnormalities found within normal limits

## 2024-10-29 NOTE — OB RN PATIENT PROFILE - AMNIOTIC FLUID COLOR, LABOR
ESRD: lung transplant patient on chronic immunosuppression   +COVID PNA  - cont supportive care  - HD today    Anemia: +CKD  - still no need for MENDY  - follow H/H     RO: hyperphosphatemia  - low phos diet   - add binders intact

## 2024-10-29 NOTE — OB PROVIDER H&P - PROBLEM SELECTOR PLAN 2
Monitor FS q hour in active labor.  Rotating D5 and Normal saline based on FS.  Shoulder dystocia precautions at delivery.

## 2024-10-29 NOTE — OB RN DELIVERY SUMMARY - NSSELHIDDEN_OBGYN_ALL_OB_FT
[NS_DeliveryAttending1_OBGYN_ALL_OB_FT:[id number]],[NS_DeliveryRN_OBGYN_ALL_OB_FT:[id number]=]

## 2024-10-29 NOTE — OB RN PATIENT PROFILE - FUNCTIONAL ASSESSMENT - DAILY ACTIVITY SCORE HIDDEN
5260-Patient given printed discharge instructions reviewed by the MD. Patient understands instructions/follow up recommendations. Patient d/c in wheelchair out of ED with family at side. 24

## 2024-10-29 NOTE — OB PROVIDER DELIVERY SUMMARY - NSSELHIDDEN_OBGYN_ALL_OB_FT
[NS_DeliveryAttending1_OBGYN_ALL_OB_FT:[id number]],[NS_DeliveryRN_OBGYN_ALL_OB_FT:[id number]=]

## 2024-10-29 NOTE — OB PROVIDER H&P - ATTENDING COMMENTS
30 y/o G1 @ 39.5 wks ga presenting in labor.     admit  labs  consents  efm/toco  IVH/clears  for fingersticks  expectant management    Liza Steele  OB attg

## 2024-10-29 NOTE — OB PROVIDER H&P - NSHPPHYSICALEXAM_GEN_ALL_CORE
Vital Signs Last 24 Hrs  T(C): 36.6 (29 Oct 2024 05:09), Max: 37.4 (28 Oct 2024 18:42)  T(F): 97.9 (29 Oct 2024 05:09), Max: 99.32 (28 Oct 2024 18:42)  HR: 118 (29 Oct 2024 11:07) (90 - 118)  BP: 143/97 (29 Oct 2024 10:58) (128/76 - 146/89)  BP(mean): --  RR: 18 (29 Oct 2024 05:09) (18 - 18)  SpO2: 100% (29 Oct 2024 11:07) (92% - 100%)  gen: moderate distress w/ctx, otherwise NAD  abd: soft, gravid  ve: 6/90/-2 intact, vertex    EFM: cat 1   toco: 3 min

## 2024-10-29 NOTE — OB PROVIDER TRIAGE NOTE - HISTORY OF PRESENT ILLNESS
OB Triage Note    30yo  @ 39w5d, MARLA 10/31 presenting to triage c/o contraction pain. Was seen earlier in triage for prolonged monitoring and was /-3. Notes contractions q5min, does not want an epidural yet. Denies LOF. +FM.     PNC: Dr. Prather  AP Issues:   1-GDMA2  - on metformin 1000mg BID  - last EFW 8lbs  PNL: GBS positive-for ampicillin    OBHx:   GynHx: +PCOS. Denies abnormal Paps, STIs, cysts, fibroids   PMH: denies  PSH: denies  Meds: PNV, bASA, metformin 1000mg BID  Allergies: NKDA  Social: denies alcohol/tobacco/drug use in pregnancy   Psych: +anxiety, was previously on xanax prior to pregnancy     Will accept blood transfusions: Yes

## 2024-10-29 NOTE — OB PROVIDER LABOR PROGRESS NOTE - NS_SUBJECTIVE/OBJECTIVE_OBGYN_ALL_OB_FT
SONIA KIM Not:    Pt seen and examined for progress. Comfortable w/ epidural.    O:  Vital Signs Last 24 Hrs  T(C): 36.6 (29 Oct 2024 05:09), Max: 37.4 (28 Oct 2024 18:42)  T(F): 97.9 (29 Oct 2024 05:09), Max: 99.32 (28 Oct 2024 18:42)  HR: 110 (29 Oct 2024 13:12) (90 - 128)  BP: 111/62 (29 Oct 2024 13:12) (104/68 - 146/89)  BP(mean): --  RR: 18 (29 Oct 2024 05:09) (18 - 18)  SpO2: 100% (29 Oct 2024 13:12) (88% - 100%)    gen: NAD  abd: soft, gravid  ve: 6.5/80/-2   AROM performed w/ copious amniotic fluid

## 2024-10-29 NOTE — OB RN TRIAGE NOTE - NSICDXPASTMEDICALHX_GEN_ALL_CORE_FT
PAST MEDICAL HISTORY:  Anemia of chronic disease     Anxiety     Asthma     History of ankle surgery     History of chronic constipation

## 2024-10-29 NOTE — OB RN PATIENT PROFILE - FUNCTIONAL ASSESSMENT - BASIC MOBILITY 6.
4-calculated by average/Not able to assess (calculate score using [facility] averaging method)

## 2024-10-29 NOTE — OB PROVIDER TRIAGE NOTE - NSHPPHYSICALEXAM_GEN_ALL_CORE
Vital Signs Last 24 Hrs  T(C): 36.6 (29 Oct 2024 05:09), Max: 37.4 (28 Oct 2024 18:42)  T(F): 97.9 (29 Oct 2024 05:09), Max: 99.32 (28 Oct 2024 18:42)  HR: 94 (29 Oct 2024 05:54) (90 - 110)  BP: 131/77 (29 Oct 2024 05:09) (128/76 - 146/89)  RR: 18 (29 Oct 2024 05:09) (18 - 18)  SpO2: 93% (29 Oct 2024 05:54) (92% - 100%)    Gen: alert, NAD  Abd: gravid, soft, non-tender  Ext: wwp, b/L LE edema, nontender    SVE: 1-2/80/-3    EFM: baseline 135, mod variability, +accels, no decels  [place]: q5-6min irregular   BSUS: vertex

## 2024-10-29 NOTE — OB PROVIDER H&P - PROBLEM SELECTOR PLAN 1
Admit  IV access  Routine labs  rotating fluid due to GDMA2   Ampicillin for GBS ppx  Anesthesia consult for pain management.  expectant .  d/w Dr. Steele.

## 2024-10-29 NOTE — OB RN DELIVERY SUMMARY - NS_TIMERUPTUREDADMITTED_OBGYN_ALL_OB_FT
Pt complaining of upper abd pain since 3am.  Pt states the pain woke her up. Pt states it feels like a gas bubble, but when she burped the pain is the same. Pt denies any vomiting. Pt states increase urinary frequency.
5 Hour(s) 43 Minute(s)
none

## 2024-10-29 NOTE — OB PROVIDER H&P - HISTORY OF PRESENT ILLNESS
30yo  @ 39w5d, MARLA 10/31 presenting to triage c/o contraction pain that has now developed to 8/10. Pt requesting analgesia. Was seen earlier in triage for prolonged monitoring and was 1.5/80/-3.  Denies LOF. +FM. +spotting/staining overnight.    PNC: Dr. Prather  AP Issues:   1-GDMA2  - on metformin 1000mg BID  - last EFW 8lbs  PNL: GBS positive-for ampicillin    OBHx:   GynHx: +PCOS. Denies abnormal Paps, STIs, cysts, fibroids   PMH: denies  PSH: denies  Meds: PNV, bASA, metformin 1000mg BID  Allergies: NKDA  Social: denies alcohol/tobacco/drug use in pregnancy   Psych: +anxiety, was previously on xanax prior to pregnancy     Will accept blood transfusions: Yes

## 2024-10-29 NOTE — OB RN DELIVERY SUMMARY - NS_SEPSISRSKCALC_OBGYN_ALL_OB_FT
EOS calculated successfully. EOS Risk Factor: 0.5/1000 live births ([facility] national incidence); GA=39w5d; Temp=98.06; ROM=5.717; GBS='Positive'; Antibiotics='Broad spectrum antibiotics 2-3.9 hrs prior to birth'

## 2024-10-30 RX ORDER — IBUPROFEN 200 MG
600 TABLET ORAL EVERY 6 HOURS
Refills: 0 | Status: DISCONTINUED | OUTPATIENT
Start: 2024-10-30 | End: 2024-10-31

## 2024-10-30 RX ORDER — HYDROCORTISONE 1 %
1 OINTMENT (GRAM) TOPICAL ONCE
Refills: 0 | Status: COMPLETED | OUTPATIENT
Start: 2024-10-30 | End: 2024-10-30

## 2024-10-30 RX ORDER — PANTOPRAZOLE SODIUM 40 MG/1
40 TABLET, DELAYED RELEASE ORAL
Refills: 0 | Status: DISCONTINUED | OUTPATIENT
Start: 2024-10-30 | End: 2024-10-31

## 2024-10-30 RX ADMIN — PANTOPRAZOLE SODIUM 40 MILLIGRAM(S): 40 TABLET, DELAYED RELEASE ORAL at 22:12

## 2024-10-30 RX ADMIN — Medication 975 MILLIGRAM(S): at 18:55

## 2024-10-30 RX ADMIN — Medication 600 MILLIGRAM(S): at 14:28

## 2024-10-30 RX ADMIN — Medication 600 MILLIGRAM(S): at 03:10

## 2024-10-30 RX ADMIN — Medication 600 MILLIGRAM(S): at 02:38

## 2024-10-30 RX ADMIN — Medication 1 TABLET(S): at 12:54

## 2024-10-30 RX ADMIN — SIMETHICONE 80 MILLIGRAM(S): 80 TABLET, CHEWABLE ORAL at 14:27

## 2024-10-30 RX ADMIN — Medication 1 SUPPOSITORY(S): at 14:29

## 2024-10-30 RX ADMIN — Medication 975 MILLIGRAM(S): at 18:16

## 2024-10-30 RX ADMIN — Medication 975 MILLIGRAM(S): at 05:47

## 2024-10-30 RX ADMIN — SIMETHICONE 80 MILLIGRAM(S): 80 TABLET, CHEWABLE ORAL at 22:12

## 2024-10-30 RX ADMIN — Medication 975 MILLIGRAM(S): at 06:37

## 2024-10-30 RX ADMIN — Medication 975 MILLIGRAM(S): at 01:02

## 2024-10-30 RX ADMIN — Medication 600 MILLIGRAM(S): at 08:56

## 2024-10-30 RX ADMIN — Medication 975 MILLIGRAM(S): at 13:30

## 2024-10-30 RX ADMIN — Medication 600 MILLIGRAM(S): at 21:30

## 2024-10-30 RX ADMIN — Medication 975 MILLIGRAM(S): at 23:40

## 2024-10-30 RX ADMIN — Medication 975 MILLIGRAM(S): at 12:53

## 2024-10-30 RX ADMIN — Medication 600 MILLIGRAM(S): at 20:44

## 2024-10-30 RX ADMIN — Medication 975 MILLIGRAM(S): at 00:02

## 2024-10-30 RX ADMIN — Medication 600 MILLIGRAM(S): at 15:00

## 2024-10-30 RX ADMIN — Medication 600 MILLIGRAM(S): at 09:15

## 2024-10-30 NOTE — PROGRESS NOTE ADULT - SUBJECTIVE AND OBJECTIVE BOX
R1 Progress Note    Patient seen and examined at bedside, no acute overnight events. No acute complaints, pain well controlled. Patient is ambulating, voiding spontaneously, passing gas, and tolerating regular diet. Denies CP, SOB, N/V, HA, or blurred vision.     Vital Signs Last 24 Hours  T(C): 36.8 (10-30-24 @ 01:01), Max: 37 (10-29-24 @ 21:15)  HR: 102 (10-30-24 @ 01:01) (94 - 135)  BP: 107/69 (10-30-24 @ 01:01) (89/50 - 143/97)  RR: 18 (10-30-24 @ 01:01) (16 - 18)  SpO2: 98% (10-30-24 @ 01:01) (76% - 100%)    Physical Exam:  General: NAD  Abdomen: Soft, non-tender, non-distended, fundus firm  Pelvic: Lochia wnl    Labs:    Blood Type: AB Positive  Antibody Screen: Negative  RPR: Negative               12.3   13.80 )-----------( 256      ( 10-29 @ 12:38 )             40.6         MEDICATIONS  (STANDING):  acetaminophen     Tablet .. 975 milliGRAM(s) Oral <User Schedule>  dextrose 5% + sodium chloride 0.9%. 1000 milliLiter(s) (125 mL/Hr) IV Continuous <Continuous>  diphtheria/tetanus/pertussis (acellular) Vaccine (Adacel) 0.5 milliLiter(s) IntraMuscular once  ibuprofen  Tablet. 600 milliGRAM(s) Oral every 6 hours  influenza   Vaccine 0.5 milliLiter(s) IntraMuscular once  ketorolac   Injectable 30 milliGRAM(s) IV Push once  oxytocin Infusion 167 milliUNIT(s)/Min (167 mL/Hr) IV Continuous <Continuous>  oxytocin Infusion 167 milliUNIT(s)/Min (167 mL/Hr) IV Continuous <Continuous>  oxytocin Infusion. 4 milliUNIT(s)/Min (4 mL/Hr) IV Continuous <Continuous>  prenatal multivitamin 1 Tablet(s) Oral daily  sodium chloride 0.9% lock flush 3 milliLiter(s) IV Push every 8 hours  sodium chloride 0.9%. 1000 milliLiter(s) (125 mL/Hr) IV Continuous <Continuous>    MEDICATIONS  (PRN):  benzocaine 20%/menthol 0.5% Spray 1 Spray(s) Topical every 6 hours PRN for Perineal discomfort  dibucaine 1% Ointment 1 Application(s) Topical every 6 hours PRN Perineal discomfort  diphenhydrAMINE 25 milliGRAM(s) Oral every 6 hours PRN Pruritus  hydrocortisone 1% Cream 1 Application(s) Topical every 6 hours PRN Moderate Pain (4-6)  lanolin Ointment 1 Application(s) Topical every 6 hours PRN nipple soreness  magnesium hydroxide Suspension 30 milliLiter(s) Oral two times a day PRN Constipation  oxyCODONE    IR 5 milliGRAM(s) Oral every 3 hours PRN Moderate to Severe Pain (4-10)  oxyCODONE    IR 5 milliGRAM(s) Oral once PRN Moderate to Severe Pain (4-10)  pramoxine 1%/zinc 5% Cream 1 Application(s) Topical every 4 hours PRN Moderate Pain (4-6)  simethicone 80 milliGRAM(s) Chew every 4 hours PRN Gas  witch hazel Pads 1 Application(s) Topical every 4 hours PRN Perineal discomfort

## 2024-10-30 NOTE — CHART NOTE - NSCHARTNOTEFT_GEN_A_CORE
Patient seen for: Nutrition consult for GDM postpartum education prior to discharge    Source: Patient, Electronic Medical Record    Patient is: ; GDMA2    Chart reviewed, events noted. Meds/Labs reviewed.    Anthropometrics:  Height: 5 feet 4 inches  Pre-pregnancy weight: 230 pounds   Weight gain: ~14 pounds   Admit/Dosing wt: 244.9 pounds     Nutrition Diagnosis:   Food and Nutrition Related Knowledge Deficit related to limited previous exposure to postpartum GDM nutrition education and recommendations as evidenced by GDM postpartum.    Goal: Pt to state at least two teach back points.    Intervention:  1. Education: Pt educated on postpartum dietary recommendations, including risk of development of T2DM; reinforced importance of DM screening 4-12 weeks postpartum. Reviewed nutrition recommendations for breast feeding, including adequate protein, calorie, and fluid intake.   2. Handout: "What to expect now that you have had your baby"     Monitoring:  No other nutrition risks were identified during this encounter.  RD remains available upon request and will follow up per protocol.    Skye Nath, DERECK, CDN  TEAMS

## 2024-10-31 ENCOUNTER — APPOINTMENT (OUTPATIENT)
Dept: OBGYN | Facility: CLINIC | Age: 29
End: 2024-10-31

## 2024-10-31 ENCOUNTER — TRANSCRIPTION ENCOUNTER (OUTPATIENT)
Age: 29
End: 2024-10-31

## 2024-10-31 VITALS
SYSTOLIC BLOOD PRESSURE: 112 MMHG | DIASTOLIC BLOOD PRESSURE: 74 MMHG | RESPIRATION RATE: 18 BRPM | HEART RATE: 95 BPM | OXYGEN SATURATION: 97 %

## 2024-10-31 PROCEDURE — 36415 COLL VENOUS BLD VENIPUNCTURE: CPT

## 2024-10-31 PROCEDURE — 84550 ASSAY OF BLOOD/URIC ACID: CPT

## 2024-10-31 PROCEDURE — 86780 TREPONEMA PALLIDUM: CPT

## 2024-10-31 PROCEDURE — 85025 COMPLETE CBC W/AUTO DIFF WBC: CPT

## 2024-10-31 PROCEDURE — 80053 COMPREHEN METABOLIC PANEL: CPT

## 2024-10-31 PROCEDURE — 83615 LACTATE (LD) (LDH) ENZYME: CPT

## 2024-10-31 PROCEDURE — 86850 RBC ANTIBODY SCREEN: CPT

## 2024-10-31 PROCEDURE — 86900 BLOOD TYPING SEROLOGIC ABO: CPT

## 2024-10-31 PROCEDURE — 86901 BLOOD TYPING SEROLOGIC RH(D): CPT

## 2024-10-31 PROCEDURE — 82962 GLUCOSE BLOOD TEST: CPT

## 2024-10-31 RX ORDER — PRENATAL VIT/IRON FUM/FOLIC AC 60 MG-1 MG
1 TABLET ORAL
Qty: 0 | Refills: 0 | DISCHARGE
Start: 2024-10-31

## 2024-10-31 RX ORDER — IBUPROFEN 200 MG
1 TABLET ORAL
Qty: 0 | Refills: 0 | DISCHARGE
Start: 2024-10-31

## 2024-10-31 RX ORDER — ACETAMINOPHEN 500 MG
3 TABLET ORAL
Qty: 0 | Refills: 0 | DISCHARGE
Start: 2024-10-31

## 2024-10-31 RX ORDER — IBUPROFEN 200 MG
600 TABLET ORAL EVERY 6 HOURS
Refills: 0 | Status: DISCONTINUED | OUTPATIENT
Start: 2024-10-31 | End: 2024-10-31

## 2024-10-31 RX ADMIN — SIMETHICONE 80 MILLIGRAM(S): 80 TABLET, CHEWABLE ORAL at 08:10

## 2024-10-31 RX ADMIN — Medication 975 MILLIGRAM(S): at 12:22

## 2024-10-31 RX ADMIN — PANTOPRAZOLE SODIUM 40 MILLIGRAM(S): 40 TABLET, DELAYED RELEASE ORAL at 08:07

## 2024-10-31 RX ADMIN — Medication 600 MILLIGRAM(S): at 03:03

## 2024-10-31 RX ADMIN — Medication 975 MILLIGRAM(S): at 13:22

## 2024-10-31 RX ADMIN — Medication 975 MILLIGRAM(S): at 05:52

## 2024-10-31 RX ADMIN — Medication 600 MILLIGRAM(S): at 09:59

## 2024-10-31 RX ADMIN — Medication 975 MILLIGRAM(S): at 06:34

## 2024-10-31 RX ADMIN — SIMETHICONE 80 MILLIGRAM(S): 80 TABLET, CHEWABLE ORAL at 03:05

## 2024-10-31 RX ADMIN — Medication 600 MILLIGRAM(S): at 08:59

## 2024-10-31 RX ADMIN — Medication 975 MILLIGRAM(S): at 00:30

## 2024-10-31 RX ADMIN — Medication 1 TABLET(S): at 12:22

## 2024-10-31 RX ADMIN — Medication 600 MILLIGRAM(S): at 03:55

## 2024-10-31 NOTE — DISCHARGE NOTE OB - NS MD DC FALL RISK RISK
For information on Fall & Injury Prevention, visit: [email or web address] OR  [email or web address] OR  https://www.cdc.gov/steadi/patient.html

## 2024-10-31 NOTE — DISCHARGE NOTE OB - CARE PROVIDER_API CALL
Steve Prather  Obstetrics and Gynecology  [address] [place] [address], [address]  [place] [address]  Phone: [phone]  Fax: [phone]  Follow Up Time: Routine

## 2024-10-31 NOTE — PROGRESS NOTE ADULT - ASSESSMENT
A/P:  PPD2 s/p .      stable for d/c  po pain meds  ambulate  reg diet    Liza Steele  OB attg
30y/o  PPD#1 from  in stable condition. Pt currently meeting postpartum milestones.     #Postpartum  - Continue with PO analgesia  - Increase ambulation  - Continue regular diet  - IV lock  - No labs    Caryn Diaz PGY1

## 2024-10-31 NOTE — PROGRESS NOTE ADULT - SUBJECTIVE AND OBJECTIVE BOX
S: Patient is doing well without complaints. Tolerates regular diet. She states lochia is in WNL. Ambulating without difficulty. Denies N/V. Voiding freely. Passing flatus. Pain well controlled with oral pain medications. Denies any HA/vision changes, CP/SOB, F/C/S.    O: Vital Signs Last 24 Hrs  T(C): 36.4 (30 Oct 2024 17:00), Max: 36.4 (30 Oct 2024 17:00)  T(F): 97.6 (30 Oct 2024 17:00), Max: 97.6 (30 Oct 2024 17:00)  HR: 95 (31 Oct 2024 05:02) (95 - 95)  BP: 112/74 (31 Oct 2024 05:02) (112/74 - 116/79)  BP(mean): --  RR: 18 (31 Oct 2024 05:02) (18 - 18)  SpO2: 97% (31 Oct 2024 05:02) (97% - 97%)    Parameters below as of 31 Oct 2024 05:02  Patient On (Oxygen Delivery Method): room air        Physical Exam:  General: NAD  Abdomen: soft, non-tender, non-distended, fundus firm  Vaginal: deferred  Ext: NTBL    Labs:             12.3   13.80 )-----------( 256      ( 10-29 @ 12:38 )             40.6     10-29 @ 12:38    133  |  97  |  10  ----------------------------<  99  3.7   |  17  |  0.45    Ca    9.6      10-29 @ 12:38    TPro  7.8  /  Alb  3.9  /  TBili  0.2  /  DBili  x   /  AST  12  /  ALT  8   /  AlkPhos  229  10-29 @ 12:38        Uric Acid: (10-29 @ 12:38)  5.5      Fibrinogen: (10-29 @ 12:38)  --       LDH: (10-29 @ 12:38)  159        MEDICATIONS  (STANDING):  acetaminophen     Tablet .. 975 milliGRAM(s) Oral <User Schedule>  dextrose 5% + sodium chloride 0.9%. 1000 milliLiter(s) (125 mL/Hr) IV Continuous <Continuous>  diphtheria/tetanus/pertussis (acellular) Vaccine (Adacel) 0.5 milliLiter(s) IntraMuscular once  ibuprofen  Tablet. 600 milliGRAM(s) Oral every 6 hours  influenza   Vaccine 0.5 milliLiter(s) IntraMuscular once  ketorolac   Injectable 30 milliGRAM(s) IV Push once  oxytocin Infusion 167 milliUNIT(s)/Min (167 mL/Hr) IV Continuous <Continuous>  oxytocin Infusion 167 milliUNIT(s)/Min (167 mL/Hr) IV Continuous <Continuous>  oxytocin Infusion. 4 milliUNIT(s)/Min (4 mL/Hr) IV Continuous <Continuous>  pantoprazole    Tablet 40 milliGRAM(s) Oral before breakfast  prenatal multivitamin 1 Tablet(s) Oral daily  sodium chloride 0.9%. 1000 milliLiter(s) (125 mL/Hr) IV Continuous <Continuous>

## 2024-10-31 NOTE — DISCHARGE NOTE OB - FINANCIAL ASSISTANCE
[facility] provides services at a reduced cost to those who are determined to be eligible through [facility]’s financial assistance program. Information regarding [facility]’s financial assistance program can be found by going to [email or web address] or by calling [phone].

## 2024-10-31 NOTE — DISCHARGE NOTE OB - PATIENT PORTAL LINK FT
You can access the FollowMyHealth Patient Portal offered by [facility] by registering at the following website: [email or web address]. By joining [employer]’s FollowMyHealth portal, you will also be able to view your health information using other applications (apps) compatible with our system.

## 2024-11-01 DIAGNOSIS — Z3A.39 39 WEEKS GESTATION OF PREGNANCY: ICD-10-CM

## 2024-11-01 DIAGNOSIS — O99.343 OTHER MENTAL DISORDERS COMPLICATING PREGNANCY, THIRD TRIMESTER: ICD-10-CM

## 2024-11-01 DIAGNOSIS — D64.9 ANEMIA, UNSPECIFIED: ICD-10-CM

## 2024-11-01 DIAGNOSIS — O99.513 DISEASES OF THE RESPIRATORY SYSTEM COMPLICATING PREGNANCY, THIRD TRIMESTER: ICD-10-CM

## 2024-11-01 DIAGNOSIS — J45.909 UNSPECIFIED ASTHMA, UNCOMPLICATED: ICD-10-CM

## 2024-11-01 DIAGNOSIS — O47.1 FALSE LABOR AT OR AFTER 37 COMPLETED WEEKS OF GESTATION: ICD-10-CM

## 2024-11-01 DIAGNOSIS — O99.013 ANEMIA COMPLICATING PREGNANCY, THIRD TRIMESTER: ICD-10-CM

## 2024-11-01 DIAGNOSIS — O24.415 GESTATIONAL DIABETES MELLITUS IN PREGNANCY, CONTROLLED BY ORAL HYPOGLYCEMIC DRUGS: ICD-10-CM

## 2024-11-01 DIAGNOSIS — O99.283 ENDOCRINE, NUTRITIONAL AND METABOLIC DISEASES COMPLICATING PREGNANCY, THIRD TRIMESTER: ICD-10-CM

## 2024-11-06 DIAGNOSIS — O99.343 OTHER MENTAL DISORDERS COMPLICATING PREGNANCY, THIRD TRIMESTER: ICD-10-CM

## 2024-11-06 DIAGNOSIS — Z3A.39 39 WEEKS GESTATION OF PREGNANCY: ICD-10-CM

## 2024-11-06 DIAGNOSIS — O24.415 GESTATIONAL DIABETES MELLITUS IN PREGNANCY, CONTROLLED BY ORAL HYPOGLYCEMIC DRUGS: ICD-10-CM

## 2024-11-06 DIAGNOSIS — O36.8330 MATERNAL CARE FOR ABNORMALITIES OF THE FETAL HEART RATE OR RHYTHM, THIRD TRIMESTER, NOT APPLICABLE OR UNSPECIFIED: ICD-10-CM

## 2024-11-06 DIAGNOSIS — F41.9 ANXIETY DISORDER, UNSPECIFIED: ICD-10-CM

## 2024-11-06 DIAGNOSIS — O99.283 ENDOCRINE, NUTRITIONAL AND METABOLIC DISEASES COMPLICATING PREGNANCY, THIRD TRIMESTER: ICD-10-CM

## 2024-11-06 DIAGNOSIS — O47.1 FALSE LABOR AT OR AFTER 37 COMPLETED WEEKS OF GESTATION: ICD-10-CM

## 2024-11-11 ENCOUNTER — APPOINTMENT (OUTPATIENT)
Dept: OBGYN | Facility: CLINIC | Age: 29
End: 2024-11-11
Payer: COMMERCIAL

## 2024-11-11 PROBLEM — D63.8 ANEMIA IN OTHER CHRONIC DISEASES CLASSIFIED ELSEWHERE: Chronic | Status: ACTIVE | Noted: 2024-10-28

## 2024-11-11 PROBLEM — J45.909 UNSPECIFIED ASTHMA, UNCOMPLICATED: Chronic | Status: ACTIVE | Noted: 2024-10-28

## 2024-11-11 PROBLEM — Z98.890 OTHER SPECIFIED POSTPROCEDURAL STATES: Chronic | Status: ACTIVE | Noted: 2024-10-28

## 2024-11-11 PROBLEM — Z87.19 PERSONAL HISTORY OF OTHER DISEASES OF THE DIGESTIVE SYSTEM: Chronic | Status: ACTIVE | Noted: 2024-10-28

## 2024-11-11 PROCEDURE — 99212 OFFICE O/P EST SF 10 MIN: CPT | Mod: 25

## 2024-11-11 PROCEDURE — 99459 PELVIC EXAMINATION: CPT | Mod: NC

## 2024-11-29 ENCOUNTER — NON-APPOINTMENT (OUTPATIENT)
Age: 29
End: 2024-11-29

## 2024-12-13 ENCOUNTER — APPOINTMENT (OUTPATIENT)
Dept: OBGYN | Facility: CLINIC | Age: 29
End: 2024-12-13
Payer: COMMERCIAL

## 2024-12-13 PROCEDURE — 0503F POSTPARTUM CARE VISIT: CPT

## 2025-02-04 NOTE — OB RN PATIENT PROFILE - NSSDOHUTIL_OBGYN_A_OB
Please let patient know  His prostate test is slightly elevated  Advised patient to recheck the PSA in 4 months  His cholesterol are stable  His A1c is slightly elevated  Advised patient to watch his diet
no

## 2025-05-20 ENCOUNTER — APPOINTMENT (OUTPATIENT)
Dept: OBGYN | Facility: CLINIC | Age: 30
End: 2025-05-20
Payer: COMMERCIAL

## 2025-05-20 PROCEDURE — 99213 OFFICE O/P EST LOW 20 MIN: CPT

## 2025-06-17 ENCOUNTER — APPOINTMENT (OUTPATIENT)
Dept: OBGYN | Facility: CLINIC | Age: 30
End: 2025-06-17
Payer: COMMERCIAL

## 2025-06-17 PROCEDURE — 99459 PELVIC EXAMINATION: CPT | Mod: NC

## 2025-06-17 PROCEDURE — 96127 BRIEF EMOTIONAL/BEHAV ASSMT: CPT

## 2025-06-17 PROCEDURE — 99395 PREV VISIT EST AGE 18-39: CPT
